# Patient Record
Sex: FEMALE | Race: WHITE | NOT HISPANIC OR LATINO | Employment: OTHER | URBAN - METROPOLITAN AREA
[De-identification: names, ages, dates, MRNs, and addresses within clinical notes are randomized per-mention and may not be internally consistent; named-entity substitution may affect disease eponyms.]

---

## 2021-03-24 ENCOUNTER — TELEPHONE (OUTPATIENT)
Dept: SURGICAL ONCOLOGY | Facility: CLINIC | Age: 66
End: 2021-03-24

## 2021-03-24 NOTE — TELEPHONE ENCOUNTER
New Patient Intake Melanoma   Patient Details:     Willie Pollard     1955     [id number]     Background Information:   [address] starts by opening a telephone encounter and gathering the following information   Who is calling to schedule?  self   Referring Provider Previous pt of Kita Masters   To Which Speciality? Medical Oncology   Reason for Visit? History Of   Tumor Type/Diagnosis? melanoma   Is there a confirmed diagnosis from Biopsy/tissue reviewed by Pathology? yes   Date of Tissue Diagnosis  If outside of [facility] obtain report and slides July 2019   Is the patient aware of diagnosis? yes   Has Imaging been done? no   If so, when and where? If outside of [facility], obtain records and disk na   Has Bloodwork been done? no   If so, when and where? If outside of [facility], obtain records  na   Is there a personal history of cancer? If yes, what kind? no   Is there a family history of cancer? If so, what kind? no    Scheduling Information:   Preferred [address]   Are there any days the patient cannot be seen? na   Miscellaneous: Pt saw Dr Kita Masters every 6 months  No treatment  Blood work was completed  After completing the above information, please route to finance, nurse navigation and clinical trials for review

## 2021-05-04 ENCOUNTER — TELEPHONE (OUTPATIENT)
Dept: SURGICAL ONCOLOGY | Facility: CLINIC | Age: 66
End: 2021-05-04

## 2021-05-04 NOTE — TELEPHONE ENCOUNTER
Dr Jolene Cox is now PAR with pt insurance, [facility] for pt to call and schedule

## 2022-01-14 ENCOUNTER — TELEPHONE (OUTPATIENT)
Dept: HEMATOLOGY ONCOLOGY | Facility: CLINIC | Age: 67
End: 2022-01-14

## 2022-01-14 NOTE — TELEPHONE ENCOUNTER
New Patient Intake Form   Patient Details:    Geri Escamilla  1955  [id number]    Appointment Information   Who is calling to schedule? Patient   If not self, what is the caller's name? Please put name of [facility] nurse as well  Referring provider Self Referral   What is the diagnosis? History of Melanoma   Is there a confirmed tissue diagnosis? No   Is patient aware of diagnosis? Yes   Have you had any imaging or labs done? If yes, where? (If imaging done outside of [facility], please remind patient to bring a disk ) No   Have you been seen by another Oncologist/Hematologist?  If so, who and where? Yes, NP [facility] On[facility]    Are the records in [facility] or Care Everywhere? No   Are records needed from an outside facility? Yes   If yes, Name of facility, city and state where facility is located  Preferred [place]   Is the patient willing to be seen by another provider?   (This is for breast patients only)    Miscellaneous Information: Requested to have patient have records sent to fax # [phone]

## 2022-01-24 ENCOUNTER — CONSULT (OUTPATIENT)
Dept: HEMATOLOGY ONCOLOGY | Facility: CLINIC | Age: 67
End: 2022-01-24
Payer: COMMERCIAL

## 2022-01-24 VITALS
HEIGHT: 65 IN | BODY MASS INDEX: 26.91 KG/M2 | RESPIRATION RATE: 16 BRPM | SYSTOLIC BLOOD PRESSURE: 110 MMHG | DIASTOLIC BLOOD PRESSURE: 70 MMHG | TEMPERATURE: 97.3 F | WEIGHT: 161.5 LBS

## 2022-01-24 DIAGNOSIS — C43.59 MALIGNANT MELANOMA OF TORSO EXCLUDING BREAST (HCC): Primary | ICD-10-CM

## 2022-01-24 DIAGNOSIS — D22.9 ATYPICAL MOLE: ICD-10-CM

## 2022-01-24 PROCEDURE — 88305 TISSUE EXAM BY PATHOLOGIST: CPT | Performed by: STUDENT IN AN ORGANIZED HEALTH CARE EDUCATION/TRAINING PROGRAM

## 2022-01-24 PROCEDURE — 88342 IMHCHEM/IMCYTCHM 1ST ANTB: CPT | Performed by: STUDENT IN AN ORGANIZED HEALTH CARE EDUCATION/TRAINING PROGRAM

## 2022-01-24 PROCEDURE — 88341 IMHCHEM/IMCYTCHM EA ADD ANTB: CPT | Performed by: STUDENT IN AN ORGANIZED HEALTH CARE EDUCATION/TRAINING PROGRAM

## 2022-01-24 PROCEDURE — 99204 OFFICE O/P NEW MOD 45 MIN: CPT | Performed by: INTERNAL MEDICINE

## 2022-01-24 RX ORDER — COVID-19 ANTIGEN TEST
KIT MISCELLANEOUS 2 TIMES DAILY
COMMUNITY

## 2022-01-24 RX ORDER — DIAZEPAM 5 MG/5ML
SOLUTION ORAL
COMMUNITY

## 2022-01-24 RX ORDER — TOPIRAMATE 100 MG/1
100 TABLET, FILM COATED ORAL DAILY
COMMUNITY
Start: 2021-10-21

## 2022-01-24 RX ORDER — ATORVASTATIN CALCIUM 10 MG/1
1 TABLET, FILM COATED ORAL DAILY
COMMUNITY
Start: 2021-12-06

## 2022-01-24 RX ORDER — GABAPENTIN 300 MG/1
300 CAPSULE ORAL
COMMUNITY
Start: 2021-11-23

## 2022-01-24 RX ORDER — PHENTERMINE HYDROCHLORIDE 37.5 MG/1
1 TABLET ORAL 2 TIMES DAILY
COMMUNITY
Start: 2022-01-12

## 2022-01-24 RX ORDER — OMEPRAZOLE 40 MG/1
1 CAPSULE, DELAYED RELEASE ORAL DAILY
COMMUNITY
Start: 2021-11-23

## 2022-01-24 RX ORDER — MV-MN/OM3/DHA/EPA/FISH/LUT/ZEA 250-5-1 MG
CAPSULE ORAL
COMMUNITY

## 2022-01-24 RX ORDER — CEPHRADINE 500 MG
200 CAPSULE ORAL
COMMUNITY

## 2022-01-24 RX ORDER — MV-MIN/FA/VIT K/LUTEIN/ZEAXANT 200MCG-5MG
CAPSULE ORAL
COMMUNITY

## 2022-01-24 RX ORDER — LIDOCAINE 50 MG/G
PATCH TOPICAL
COMMUNITY

## 2022-01-24 RX ORDER — MELATONIN: COMMUNITY

## 2022-01-24 RX ORDER — DULOXETIN HYDROCHLORIDE 30 MG/1
1 CAPSULE, DELAYED RELEASE ORAL DAILY
COMMUNITY
Start: 2021-11-23

## 2022-01-24 RX ORDER — MECLIZINE HCL 12.5 MG/1
TABLET ORAL
COMMUNITY

## 2022-01-24 RX ORDER — ASCORBIC ACID 500 MG
500 TABLET ORAL DAILY
COMMUNITY

## 2022-01-24 RX ORDER — METFORMIN HYDROCHLORIDE 750 MG/1
750 TABLET, EXTENDED RELEASE ORAL
COMMUNITY
Start: 2022-01-05

## 2022-01-24 NOTE — LETTER
February 1, 2022     Glen Mckinnon MD  [address] [facility]  [address] / [address]    Patient: Valeria Hawkins   YOB: 1955   Date of Visit: 1/24/2022       Dear Dr Marthann Frankel:    Thank you for referring Valeria Hawkins to me for evaluation  Below are my notes for this consultation  If you have questions, please do not hesitate to call me  I look forward to following your patient along with you  Sincerely,        Primo Hernandez MD        CC: No Recipients  Primo Hernandez MD  2/1/2022 10:14 AM  Sign when Signing Visit  [address] [facility]  [phone]  [phone]     Date of Visit: 1/24/2022  Name: Valeria Hawkins   YOB: 1955       Subjective    VISIT DIAGNOSIS:  Diagnoses and all orders for this visit:    Malignant melanoma of torso excluding breast ([facility] )  -     General procedure    Atypical mole  -     Tissue Exam; Future  -     Tissue Exam  -     General procedure    Other orders  -     Multiple Vitamin (MULTIVITAMIN ADULT PO); Take 1 capsule by mouth daily  -     Alpha-Lipoic Acid 200 MG CAPS; 200 mg  -     ascorbic acid (VITAMIN C) 500 MG tablet; Take 500 mg by mouth daily  -     atorvastatin (LIPITOR) 10 mg tablet; Take 1 tablet by mouth daily  -     Calcium Carbonate-Vitamin D (Calcium 600+D) 600-400 MG-UNIT per tablet  -     Calcium Carbonate 1500 (600 Ca) MG TABS; Take 600 mg by mouth  -     cholecalciferol (VITAMIN D3) 1,000 units tablet  -     cyanocobalamin (VITAMIN B-12) 1000 MCG tablet; Take by mouth  -     diazePAM 5 MG/5ML SOLN  -     DULoxetine (CYMBALTA) 30 mg delayed release capsule; Take 1 capsule by mouth daily  -     gabapentin (NEURONTIN) 300 mg capsule; Take 300 mg by mouth  -     lidocaine (Lidoderm) 5 %  -     meclizine (ANTIVERT) 12 5 MG tablet  -     metFORMIN (GLUCOPHAGE-XR) 750 mg 24 hr tablet;  Take 750 mg by mouth daily with dinner  -     Multiple Vitamins-Minerals (Ocuvite Adult 50+) CAPS  -     Multiple Vitamins-Minerals (PreserVision AREDS 2+Multi Vit) CAPS  -     Multiple Vitamins-Minerals (WOMENS 50+ MULTI VITAMIN/MIN PO)  -     Naproxen Sodium 220 MG CAPS; Take by mouth 2 (two) times a day  -     omeprazole (PriLOSEC) 40 MG capsule; Take 1 capsule by mouth daily  -     phentermine (ADIPEX-P) 37 5 MG tablet; Take 1 tablet by mouth 2 (two) times a day  -     topiramate (TOPAMAX) 100 mg tablet; Take 100 mg by mouth daily        Oncology History   [facility]align[facility]   6/26/2018 -  Cancer Staged    Staging form: Melanoma of the Skin, AJCC 8th Edition  - Clinical stage from 6/26/2018: Stage 0 (cTis, cN0, cM0) - Signed by Mirna Redd MD on 1/31/2022 6/26/2018 Initial Diagnosis    Malignant melanoma of torso excluding breast ([facility] )  MIS + margins     8/3/2018 Surgery    Excision - residual melanoma in situ and scar, negative margins        Cancer Staging  Malignant melanoma of torso excluding breast ([facility] )  Staging form: Melanoma of the Skin, AJCC 8th Edition  - Clinical stage from 6/26/2018: Stage 0 (cTis, cN0, cM0) - Signed by Mirna Redd MD on 1/31/2022       HISTORY OF PRESENT ILLNESS: Héctor Cedillo is a 61 y o  female  who I has seen at [facility] for her melanoma in situ  She was initially diagnosed with melanoma in situ in 2018  She states that she faithfully sees her dermatologist, Dr Evins Dancer, every 6 months  She has had BCC in the past  At her visit over the summer of 2018, Dr Evins Dancer noted a lesion on her back that had looked concerning  She underwent a biopsy on 6/26/2018  Pathology demonstrated melanoma in situ with + margins  She underwent excision on 8/3/2018  Pathology demonstrated residual melanoma in situ and scar, and the lesion was completely excised  She then got blood work done and a CXR  Bloodwork was normal but there was nodular scarring in the lingula on CXR   She then underwent a CT Chest for further evaluation  CT Chest demonstrated scarring in the lingula or atelectasis corresponding to CXR findings, and non-specific submentimeter lung nodules  All follow up was negative  She has developed progressive neuropathy in her feet over the past year  Also had Charcot foot, requiring a boot  She now no longer needs a boot  Has improved and no longer in pain  She had followed with me at [facility] from 9/2018 until I left in Feb 2021  She follows closely with her dermatologist Dr Kendrick Frey as well  She had seen my colleagues at [facility] as well in follow up  She is doing well today and is here to continue follow up with me here at [facility]  Continues to follow with Dr Kendrick Frey  She is worried about a lesion/spot just superior to her MIS scar  It doesn't itch or bleed and it isn't painful, she just feels it near her old scar and is concerned about it  Radiology review:   9/18/2018 CT Chest -   Small perifissural nodules  Linear scarring or atelectasis in the lingula  Consider non-contrast CT chest in 6 - 12 months  8/30/2018 CXR -  Likely nodular scarring in the lingula  Given the patient's history, non-contrast CT of the chest is recommended  Pathology review:   08/03/2018 excision:  Left upper back, excision - residual melanoma in situ and scar; completely excised    06/26/2018 biopsy:  Left upper back, shave biopsy - melanoma in situ; extending to the peripheral and deep tissue edges    She is retired but used to work at [employer]  She works for the [employer] in the [employer] during home games  She has increased sun exposure when she was young  Specifically, when she was 17, she visited friends in [facility] and experienced a bad sunburn with blisters on her back  No other episodes of blistering sunburns, but has had additional sunburns  She denies tanning beds  No family history of melanoma  Her father had cutaneous SCC    Her maternal aunt had breast cancer in her 46s (12) and she is alive and well  Her maternal uncle had throat cancer  She has dirty brown/light brown hair and blue eyes and is [place] descent, and maybe [place]  Health Maintenance:  Colonoscopy - up to date   MMGM - up to date  Pap smear - up to date        REVIEW OF SYSTEMS:  Review of Systems   Constitutional: Negative for appetite change, fatigue, fever and unexpected weight change  HENT:   Negative for lump/mass  Eyes: Negative for icterus  Respiratory: Negative for cough, shortness of breath and wheezing  Cardiovascular: Negative for leg swelling  Gastrointestinal: Negative for abdominal pain, constipation, diarrhea, nausea and vomiting  Genitourinary: Negative for difficulty urinating and hematuria  Musculoskeletal: Negative for arthralgias, gait problem and myalgias  Skin: Negative for itching and rash  Worried about spot superior and lateral to MIS scar site  Not painful, bleeding, or itching, just notices it there  Neurological: Negative for extremity weakness, gait problem, headaches, light-headedness and numbness  Hematological: Negative for adenopathy          MEDICATIONS:    Current Outpatient Medications:     Alpha-Lipoic Acid 200 MG CAPS, 200 mg, Disp: , Rfl:     ascorbic acid (VITAMIN C) 500 MG tablet, Take 500 mg by mouth daily, Disp: , Rfl:     atorvastatin (LIPITOR) 10 mg tablet, Take 1 tablet by mouth daily, Disp: , Rfl:     Calcium Carbonate 1500 (600 Ca) MG TABS, Take 600 mg by mouth, Disp: , Rfl:     Calcium Carbonate-Vitamin D (Calcium 600+D) 600-400 MG-UNIT per tablet, , Disp: , Rfl:     cholecalciferol (VITAMIN D3) 1,000 units tablet, , Disp: , Rfl:     cyanocobalamin (VITAMIN B-12) 1000 MCG tablet, Take by mouth, Disp: , Rfl:     diazePAM 5 MG/5ML SOLN, , Disp: , Rfl:     DULoxetine (CYMBALTA) 30 mg delayed release capsule, Take 1 capsule by mouth daily, Disp: , Rfl:     gabapentin (NEURONTIN) 300 mg capsule, Take 300 mg by mouth, Disp: , Rfl:     lidocaine (Lidoderm) 5 %, , Disp: , Rfl:     meclizine (ANTIVERT) 12 5 MG tablet, , Disp: , Rfl:     metFORMIN (GLUCOPHAGE-XR) 750 mg 24 hr tablet, Take 750 mg by mouth daily with dinner, Disp: , Rfl:     Multiple Vitamin (MULTIVITAMIN ADULT PO), Take 1 capsule by mouth daily, Disp: , Rfl:     Multiple Vitamins-Minerals (Ocuvite Adult 50+) CAPS, , Disp: , Rfl:     Multiple Vitamins-Minerals (PreserVision AREDS 2+Multi Vit) CAPS, , Disp: , Rfl:     Multiple Vitamins-Minerals (WOMENS 50+ MULTI VITAMIN/MIN PO), , Disp: , Rfl:     Naproxen Sodium 220 MG CAPS, Take by mouth 2 (two) times a day, Disp: , Rfl:     omeprazole (PriLOSEC) 40 MG capsule, Take 1 capsule by mouth daily, Disp: , Rfl:     phentermine (ADIPEX-P) 37 5 MG tablet, Take 1 tablet by mouth 2 (two) times a day, Disp: , Rfl:     topiramate (TOPAMAX) 100 mg tablet, Take 100 mg by mouth daily, Disp: , Rfl:      ALLERGIES:  Allergies   Allergen Reactions    Doxycycline Hives, Itching, Rash and Shortness Of Breath    Lidocaine Anaphylaxis, Dizziness, Itching, Palpitations, Rash, Shortness Of Breath, Throat Swelling and Tongue Swelling        ACTIVE PROBLEMS:  Patient Active Problem List   Diagnosis    Malignant [facility] )          PAST MEDICAL HISTORY:   Past Medical History:   Diagnosis Date    Allergic rhinitis     Arthritis     Benign neoplasm of large bowel     Chronic rhinitis     Colon polyp     Depression     Diabetes [facility]     GERD (gastroesophageal reflux disease)     Hypertension     Meniere's disease     Neuropathic pain     Skin cancer     BCC        PAST SURGICAL HISTORY:  Past Surgical History:   Procedure Laterality Date    COLONOSCOPY          SOCIAL HISTORY:  Social History     Socioeconomic History    Marital status: Single     Spouse name: None    Number of children: None    Years of education: None    Highest education level: None Occupational History    None   Tobacco Use    Smoking status: Never Smoker    Smokeless tobacco: Never Used   Vaping Use    Vaping Use: Never used   Substance and Sexual Activity    Alcohol use: Not Currently    Drug use: Never    Sexual activity: None   Other Topics Concern    None   Social History Narrative    None     Social Determinants of Health     Financial Resource Strain: Not on file   Food Insecurity: Not on file   Transportation Needs: Not on file   Physical Activity: Not on file   Stress: Not on file   Social Connections: Not on file   Intimate Partner Violence: Not on file   Housing Stability: Not on file        FAMILY HISTORY:  Family History   Problem Relation Age of Onset    Hypertension Mother     Arthritis Mother     Hyperlipidemia Mother     Thyroid disease Mother     Squamous cell carcinoma Father     Hypertension Father     Heart disease Father     Breast cancer Maternal Aunt     Throat cancer Maternal Uncle           Objective    PHYSICAL EXAMINATION:   Blood pressure 110/70, temperature (!) 97 3 °F (36 3 °C), temperature source Tympanic, resp  rate 16, height 5' 5" (1 651 m), weight 73 3 kg (161 lb 8 oz)  Pain Score: 0-No pain      ECOG Performance Status      Most Recent Value   ECOG Performance Status 0 - Fully active, able to carry on all pre-disease performance without restriction            Physical Exam  Constitutional:       General: She is not in acute distress  Appearance: Normal appearance  She is not toxic-appearing  HENT:      Mouth/Throat:      Mouth: Mucous membranes are moist       Pharynx: Oropharynx is clear  Eyes:      General: No scleral icterus  Cardiovascular:      Rate and Rhythm: Normal rate and regular rhythm  Pulses: Normal pulses  Heart sounds: No murmur heard  No friction rub  No gallop  Pulmonary:      Effort: Pulmonary effort is normal  No respiratory distress  Breath sounds: Normal breath sounds   No wheezing or rales    Chest:   Breasts:      Right: No axillary adenopathy or supraclavicular adenopathy  Left: No axillary adenopathy or supraclavicular adenopathy  Abdominal:      General: There is no distension  Palpations: There is no mass  Tenderness: There is no abdominal tenderness  There is no rebound  Musculoskeletal:         General: No swelling or tenderness  Right lower leg: No edema  Left lower leg: No edema  Lymphadenopathy:      Head:      Right side of head: No submandibular, preauricular or posterior auricular adenopathy  Left side of head: No submandibular, preauricular or posterior auricular adenopathy  Cervical: No cervical adenopathy  Right cervical: No superficial or posterior cervical adenopathy  Left cervical: No superficial or posterior cervical adenopathy  Upper Body:      Right upper body: No supraclavicular or axillary adenopathy  Left upper body: No supraclavicular or axillary adenopathy  Lower Body: No right inguinal adenopathy  No left inguinal adenopathy  Skin:     Findings: No rash  Comments: Well healed surgical scar  No evidence of recurrence at primary site  Small 4-5 mm skin colored lesion - looks like mole  Neurological:      General: No focal deficit present  Mental Status: She is alert and oriented to person, place, and time  Psychiatric:         Mood and Affect: Mood normal          Behavior: Behavior normal          Thought Content: Thought content normal          Judgment: Judgment normal            I reviewed lab data in the chart  Labs in [facility] from 1/19/2022    WBC 3 4 - 10 8 x10E3/uL 6 8     RBC 3 77 - 5 28 x10E6/uL 4 60     Hemoglobin 11 1 - 15 9 g/dL 13 9     Hematocrit 34 0 - 46 6 % 40 9     MCV 79 - 97 fL 89     MCH 26 6 - 33 0 pg 30 2     MCHC 31 5 - 35 7 g/dL 34 0     RDW 11 7 - 15 4 % 12 9     Platelets 576 - 179 [id number]/     Neutrophils Not Estab  % 63     Lymphs Not Estab  % 24     Monocytes Not Estab  % 7     Eos Not Estab  % 5     Basos Not Estab  % 1     Neutrophils (Absolute) 1 4 - 7 0 x10E3/uL 4 3     Lymphs (Absolute) 0 7 - 3 1 x10E3/uL 1 6     Monocytes(Absolute) 0 1 - 0 9 x10E3/uL 0 4     Eos (Absolute) 0 0 - 0 4 x10E3/uL 0 4     Baso (Absolute) 0 0 - 0 2 x10E3/uL 0 0     Immature Granulocytes Not Estab  % 0     Immature Grans (Abs) 0 0 - 0 1 x10E3/uL 0 0        Glucose, Serum 65 - 99 mg/dL 104 High      BUN 8 - 27 mg/dL 20     Creatinine, Serum 0 57 - 1 00 mg/dL 1 00     eGFR If NonAfricn Am >59 mL/min/1 73 60     eGFR If Africn Am >59 mL/min/1 73 69  **In accordance with recommendations from the NKF-ASN Task force,**     [facility] is in the process of updating its eGFR calculation to the     7925 CKD-EPI creatinine equation that estimates kidney function     without a race variable  BUN/Creatinine Ratio 12 - 28 20     Sodium, Serum 134 - 144 mmol/L 142     Potassium, Serum 3 5 - 5 2 mmol/L 4 6     Chloride, Serum 96 - 106 mmol/L 106     Carbon Dioxide, Total 20 - 29 mmol/L 24     Calcium, Serum 8 7 - 10 3 mg/dL 9 9     Protein, Total, Serum 6 0 - 8 5 g/dL 6 6     Albumin, Serum 3 8 - 4 8 g/dL 4 5     Globulin, Total 1 5 - 4 5 g/dL 2 1     A/G Ratio 1 2 - 2 2 2 1     Bilirubin, Total 0 0 - 1 2 mg/dL 0 3     Alkaline Phosphatase, S 44 - 121 IU/L 97     AST (SGOT) 0 - 40 IU/L 35     ALT (SGPT) 0 - 32 IU/L 27        - 226 IU/L 179              RECENT IMAGING:  No results found  Assessment/Plan  Ms Gerry Ho is a 61 yr female with melanoma in situ with new concerning skin lesion here to establish care here with me at [facility] and follow up  1  Malignant melanoma of torso excluding breast ([address] )  She is doing well and feels good  Continues ongoing close dermatological follow up with Dr Yossi Gonzalez  Reviewed labs in [facility] and they look good, with minor elevation in glucose  No clinical evidence of melanoma recurrence        Ms Gerry Ho will continue to alternate visits between myself and Dr Tomasz Tello  She will return to see me in 6 months  Labs ordered and provided her with scripts  2  Atypical mole  She is concerned about the skin lesion near her previous scar  Since it is concerning for her, we will biopsy the lesion today  I will call her with the pathology results  General procedure    Date/Time: 1/24/2022 10:07 AM  Performed by: Kwasi Farley MD  Authorized by: Kwasi Farley MD     Patient location:  Other (comment)  Assisting Provider(s): No    Consent:     Consent obtained:  Verbal    Consent given by:  Patient    Risks discussed:  Bleeding, infection and pain    Alternatives discussed:  No treatment and observation  Indications:     Indications:  Mole/lesion evaluation  Pre-procedure details:     Skin preparation:  Betadine    Preparation: Patient was prepped and draped in the usual sterile fashion    Anesthesia (see MAR for exact dosages): Anesthesia method:  Local infiltration    Local anesthetic:  Bupivacaine 0 25% w/o epi  Procedure Detail:     Procedure note (site, laterality, method, findings): After obtaining informed consent, the patient was draped and prepped in the [facility]  Betadine was used for cleansing  The area was anesthetized with 0 25% bupivacaine w/out epinephrine  A 4 mm punch biopsy was performed without difficulty  The biopsy was closed with 1 4-0 ethilon sutures  Bacitracin was applied to the biopsy site and a dry sterile dressing was placed  Wound care instructions were provided  The patient tolerated the procedure well with no complications  She will have her sutures removed by her PCP, or will come back here if unable to go to PCP  Post-procedure details:     Patient tolerance of procedure: Tolerated well, no immediate complications        - Tissue Exam; Future  - Tissue Exam    She knows to call with any issues or concerns prior to her next visit      Sylwia Zuniga MD, PhD

## 2022-01-31 PROBLEM — C43.59 MALIGNANT MELANOMA OF TORSO EXCLUDING BREAST (HCC): Status: ACTIVE | Noted: 2022-01-31

## 2022-01-31 NOTE — RESULT ENCOUNTER NOTE
Called and let Ms Juan Brown know that her biopsy came back as dermal melanocytic nevus  She was happy to hear this news      Erica Olvera MD, PhD

## 2022-01-31 NOTE — PROGRESS NOTES
[facility]ATOLOGY ONCOL[facility]Y [facility] [place]  [address] [id number]  [phone]     Date of Visit: 1/24/2022  Name: Evelyn Weinberg   YOB: 1955       Subjective    VISIT DIAGNOSIS:  Diagnoses and all orders for this visit:    Malignant melanoma of torso excluding breast ([facility] [address] )  -     General procedure    Atypical mole  -     Tissue Exam; Future  -     Tissue Exam  -     General procedure    Other orders  -     Multiple Vitamin (MULTIVITAMIN ADULT PO); Take 1 capsule by mouth daily  -     Alpha-Lipoic Acid 200 MG CAPS; 200 mg  -     ascorbic acid (VITAMIN C) 500 MG tablet; Take 500 mg by mouth daily  -     atorvastatin (LIPITOR) 10 mg tablet; Take 1 tablet by mouth daily  -     Calcium Carbonate-Vitamin D (Calcium 600+D) 600-400 MG-UNIT per tablet  -     Calcium Carbonate 1500 (600 Ca) MG TABS; Take 600 mg by mouth  -     cholecalciferol (VITAMIN D3) 1,000 units tablet  -     cyanocobalamin (VITAMIN B-12) 1000 MCG tablet; Take by mouth  -     diazePAM 5 MG/5ML SOLN  -     DULoxetine (CYMBALTA) 30 mg delayed release capsule; Take 1 capsule by mouth daily  -     gabapentin (NEURONTIN) 300 mg capsule; Take 300 mg by mouth  -     lidocaine (Lidoderm) 5 %  -     meclizine (ANTIVERT) 12 5 MG tablet  -     metFORMIN (GLUCOPHAGE-XR) 750 mg 24 hr tablet; Take 750 mg by mouth daily with dinner  -     Multiple Vitamins-Minerals (Ocuvite Adult 50+) CAPS  -     Multiple Vitamins-Minerals (PreserVision AREDS 2+Multi Vit) CAPS  -     Multiple Vitamins-Minerals (WOMENS 50+ MULTI VITAMIN/MIN PO)  -     Naproxen Sodium 220 MG CAPS; Take by mouth 2 (two) times a day  -     omeprazole (PriLOSEC) 40 MG capsule; Take 1 capsule by mouth daily  -     phentermine (ADIPEX-P) 37 5 MG tablet; Take 1 tablet by mouth 2 (two) times a day  -     topiramate (TOPAMAX) 100 mg tablet;  Take 100 mg by mouth daily        Oncology History   Malignant melanoma of torso excluding breast ([facility] [address] )   6/26/2018 - Cancer Staged    Staging form: Melanoma of the Skin, AJCC 8th Edition  - Clinical stage from 6/26/2018: Stage 0 (cTis, cN0, cM0) - Signed by Rosemary Núñez MD on 1/31/2022 6/26/2018 Initial Diagnosis    Malignant melanoma of torso excluding breast ([facility] [address] )  MIS + margins     8/3/2018 Surgery    Excision - residual melanoma in situ and scar, negative margins        Cancer Staging  Malignant melanoma of torso excluding breast ([facility] [address] )  Staging form: Melanoma of the Skin, AJCC 8th Edition  - Clinical stage from 6/26/2018: Stage 0 (cTis, cN0, cM0) - Signed by Rosemary Núñez MD on 1/31/2022       HISTORY OF PRESENT ILLNESS: Debora Fu is a 61 y o  female  who I has seen at [facility] for her melanoma in situ  She was initially diagnosed with melanoma in situ in 2018  She states that she faithfully sees her dermatologist, Dr Darell Duran, every 6 months  She has had BCC in the past  At her visit over the summer of 2018, Dr Darell Duran noted a lesion on her back that had looked concerning  She underwent a biopsy on 6/26/2018  Pathology demonstrated melanoma in situ with + margins  She underwent excision on 8/3/2018  Pathology demonstrated residual melanoma in situ and scar, and the lesion was completely excised  She then got blood work done and a CXR  Bloodwork was normal but there was nodular scarring in the lingula on CXR  She then underwent a CT Chest for further evaluation  CT Chest demonstrated scarring in the lingula or atelectasis corresponding to CXR findings, and non-specific submentimeter lung nodules  All follow up was negative  She has developed progressive neuropathy in her feet over the past year  Also had Charcot foot, requiring a boot  She now no longer needs a boot  Has improved and no longer in pain  She had followed with me at [facility] from 9/2018 until I left in Feb 2021  She follows closely with her dermatologist Dr Darell Duran as well    She had seen my colleagues at [facility] as well in follow up  She is doing well today and is here to continue follow up with me here at [facility]  Continues to follow with Dr Naif Resendiz  She is worried about a lesion/spot just superior to her MIS scar  It doesn't itch or bleed and it isn't painful, she just feels it near her old scar and is concerned about it  Radiology review:   9/18/2018 CT Chest -   Small perifissural nodules  Linear scarring or atelectasis in the lingula  Consider non-contrast CT chest in 6 - 12 months  8/30/2018 CXR -  Likely nodular scarring in the lingula  Given the patient's history, non-contrast CT of the chest is recommended  Pathology review:   08/03/2018 excision:  Left upper back, excision - residual melanoma in situ and scar; completely excised    06/26/2018 biopsy:  Left upper back, shave biopsy - melanoma in situ; extending to the peripheral and deep tissue edges    She is retired but used to work at [employer]  She works for the [employer] in the [email or web address] during home games  She has increased sun exposure when she was young  Specifically, when she was 17, she visited friends in [facility] and experienced a bad sunburn with blisters on her back  No other episodes of blistering sunburns, but has had additional sunburns  She denies tanning beds  No family history of melanoma  Her father had cutaneous SCC  Her maternal aunt had breast cancer in her 46s (12) and she is alive and well  Her maternal uncle had throat cancer  She has dirty brown/light brown hair and blue eyes and is [place] descent, and maybe [place]  Health Maintenance:  Colonoscopy - up to date   MMGM - up to date  Pap smear - up to date        REVIEW OF SYSTEMS:  Review of Systems   Constitutional: Negative for appetite change, fatigue, fever and unexpected weight change  HENT:   Negative for lump/mass  Eyes: Negative for icterus  Respiratory: Negative for cough, shortness of breath and wheezing  Cardiovascular: Negative for leg swelling  Gastrointestinal: Negative for abdominal pain, constipation, diarrhea, nausea and vomiting  Genitourinary: Negative for difficulty urinating and hematuria  Musculoskeletal: Negative for arthralgias, gait problem and myalgias  Skin: Negative for itching and rash  Worried about spot superior and lateral to MIS scar site  Not painful, bleeding, or itching, just notices it there  Neurological: Negative for extremity weakness, gait problem, headaches, light-headedness and numbness  Hematological: Negative for adenopathy          MEDICATIONS:    Current Outpatient Medications:     Alpha-Lipoic Acid 200 MG CAPS, 200 mg, Disp: , Rfl:     ascorbic acid (VITAMIN C) 500 MG tablet, Take 500 mg by mouth daily, Disp: , Rfl:     atorvastatin (LIPITOR) 10 mg tablet, Take 1 tablet by mouth daily, Disp: , Rfl:     Calcium Carbonate 1500 (600 Ca) MG TABS, Take 600 mg by mouth, Disp: , Rfl:     Calcium Carbonate-Vitamin D (Calcium 600+D) 600-400 MG-UNIT per tablet, , Disp: , Rfl:     cholecalciferol (VITAMIN D3) 1,000 units tablet, , Disp: , Rfl:     cyanocobalamin (VITAMIN B-12) 1000 MCG tablet, Take by mouth, Disp: , Rfl:     diazePAM 5 MG/5ML SOLN, , Disp: , Rfl:     DULoxetine (CYMBALTA) 30 mg delayed release capsule, Take 1 capsule by mouth daily, Disp: , Rfl:     gabapentin (NEURONTIN) 300 mg capsule, Take 300 mg by mouth, Disp: , Rfl:     lidocaine (Lidoderm) 5 %, , Disp: , Rfl:     meclizine (ANTIVERT) 12 5 MG tablet, , Disp: , Rfl:     metFORMIN (GLUCOPHAGE-XR) 750 mg 24 hr tablet, Take 750 mg by mouth daily with dinner, Disp: , Rfl:     Multiple Vitamin (MULTIVITAMIN ADULT PO), Take 1 capsule by mouth daily, Disp: , Rfl:     Multiple Vitamins-Minerals (Ocuvite Adult 50+) CAPS, , Disp: , Rfl:     Multiple Vitamins-Minerals (PreserVision AREDS 2+Multi Vit) CAPS, , Disp: , Rfl:     Multiple Vitamins-Minerals (WOMENS 50+ MULTI VITAMIN/MIN PO), , Disp: , Rfl:     Naproxen Sodium 220 MG CAPS, Take by mouth 2 (two) times a day, Disp: , Rfl:     omeprazole (PriLOSEC) 40 MG capsule, Take 1 capsule by mouth daily, Disp: , Rfl:     phentermine (ADIPEX-P) 37 5 MG tablet, Take 1 tablet by mouth 2 (two) times a day, Disp: , Rfl:     topiramate (TOPAMAX) 100 mg tablet, Take 100 mg by mouth daily, Disp: , Rfl:      ALLERGIES:  Allergies   Allergen Reactions    Doxycycline Hives, Itching, Rash and Shortness Of Breath    Lidocaine Anaphylaxis, Dizziness, Itching, Palpitations, Rash, Shortness Of Breath, Throat Swelling and Tongue Swelling        ACTIVE PROBLEMS:  Patient Active Problem List   Diagnosis    Malignant melanoma of torso excluding breast ([facility] [facility] )          PAST MEDICAL HISTORY:   Past Medical History:   Diagnosis Date    Allergic rhinitis     Arthritis     Benign neoplasm of large bowel     Chronic rhinitis     Colon polyp     Depression     Diabetes mellitus (HCC)     GERD (gastroesophageal reflux disease)     Hypertension     Meniere's disease     Neuropathic pain     Skin cancer     BCC        PAST SURGICAL HISTORY:  Past Surgical History:   Procedure Laterality Date    COLONOSCOPY          SOCIAL HISTORY:  Social History     Socioeconomic History    Marital status: Single     Spouse name: None    Number of children: None    Years of education: None    Highest education level: None   Occupational History    None   Tobacco Use    Smoking status: Never Smoker    Smokeless tobacco: Never Used   Vaping Use    Vaping Use: Never used   Substance and Sexual Activity    Alcohol use: Not Currently    Drug use: Never    Sexual activity: None   Other Topics Concern    None   Social History Narrative    None     Social Determinants of Health     Financial Resource Strain: Not on file   Food Insecurity: Not on file   Transportation Needs: Not on file   Physical Activity: Not on file   Stress: Not on file   Social Connections: Not on file Intimate Partner Violence: Not on file   Housing Stability: Not on file        FAMILY HISTORY:  Family History   Problem Relation Age of Onset    Hypertension Mother     Arthritis Mother     Hyperlipidemia Mother     Thyroid disease Mother     Squamous cell carcinoma Father     Hypertension Father     Heart disease Father     Breast cancer Maternal Aunt     Throat cancer Maternal Uncle           Objective    PHYSICAL EXAMINATION:   Blood pressure 110/70, temperature (!) 97 3 °F (36 3 °C), temperature source Tympanic, resp  rate 16, height 5' 5" (1 651 m), weight 73 3 kg (161 lb 8 oz)  Pain Score: 0-No pain      ECOG Performance Status      Most Recent Value   ECOG Performance Status 0 - Fully active, able to carry on all pre-disease performance without restriction            Physical Exam  Constitutional:       General: She is not in acute distress  Appearance: Normal appearance  She is not toxic-appearing  HENT:      Mouth/Throat:      Mouth: Mucous membranes are moist       Pharynx: Oropharynx is clear  Eyes:      General: No scleral icterus  Cardiovascular:      Rate and Rhythm: Normal rate and regular rhythm  Pulses: Normal pulses  Heart sounds: No murmur heard  No friction rub  No gallop  Pulmonary:      Effort: Pulmonary effort is normal  No respiratory distress  Breath sounds: Normal breath sounds  No wheezing or rales  Chest:   Breasts:      Right: No axillary adenopathy or supraclavicular adenopathy  Left: No axillary adenopathy or supraclavicular adenopathy  Abdominal:      General: There is no distension  Palpations: There is no mass  Tenderness: There is no abdominal tenderness  There is no rebound  Musculoskeletal:         General: No swelling or tenderness  Right lower leg: No edema  Left lower leg: No edema     Lymphadenopathy:      Head:      Right side of head: No submandibular, preauricular or posterior auricular adenopathy  Left side of head: No submandibular, preauricular or posterior auricular adenopathy  Cervical: No cervical adenopathy  Right cervical: No superficial or posterior cervical adenopathy  Left cervical: No superficial or posterior cervical adenopathy  Upper Body:      Right upper body: No supraclavicular or axillary adenopathy  Left upper body: No supraclavicular or axillary adenopathy  Lower Body: No right inguinal adenopathy  No left inguinal adenopathy  Skin:     Findings: No rash  Comments: Well healed surgical scar  No evidence of recurrence at primary site  Small 4-5 mm skin colored lesion - looks like mole  Neurological:      General: No focal deficit present  Mental Status: She is alert and oriented to person, place, and time  Psychiatric:         Mood and Affect: Mood normal          Behavior: Behavior normal          Thought Content: Thought content normal          Judgment: Judgment normal            I reviewed lab data in the chart    Labs in [facility] from 1/19/2022    WBC 3 4 - 10 8 x10E3/uL 6 8     RBC 3 77 - 5 28 x10E6/uL 4 60     Hemoglobin 11 1 - 15 9 g/dL 13 9     Hematocrit 34 0 - 46 6 % 40 9     MCV 79 - 97 fL 89     MCH 26 6 - 33 0 pg 30 2     MCHC 31 5 - 35 7 g/dL 34 0     RDW 11 7 - 15 4 % 12 9     Platelets 731 - 067 [id number]     Neutrophils Not Estab  % 63     Lymphs Not Estab  % 24     Monocytes Not Estab  % 7     Eos Not Estab  % 5     Basos Not Estab  % 1     Neutrophils (Absolute) 1 4 - 7 0 x10E3/uL 4 3     Lymphs (Absolute) 0 7 - 3 1 x10E3/uL 1 6     Monocytes(Absolute) 0 1 - 0 9 x10E3/uL 0 4     Eos (Absolute) 0 0 - 0 4 x10E3/uL 0 4     Baso (Absolute) 0 0 - 0 2 x10E3/uL 0 0     Immature Granulocytes Not Estab  % 0     Immature Grans (Abs) 0 0 - 0 1 x10E3/uL 0 0        Glucose, Serum 65 - 99 mg/dL 104 High      BUN 8 - 27 mg/dL 20     Creatinine, Serum 0 57 - 1 00 mg/dL 1 00     eGFR If NonAfricn Am >59 mL/min/1 73 60     eGFR If Africn Am >59 mL/min/1 73 69  **In accordance with recommendations from the NKF-ASN Task force,**     [facility] is in the process of updating its eGFR calculation to the     2021 CKD-EPI creatinine equation that estimates kidney function     without a race variable  BUN/Creatinine Ratio 12 - 28 20     Sodium, Serum 134 - 144 mmol/L 142     Potassium, Serum 3 5 - 5 2 mmol/L 4 6     Chloride, Serum 96 - 106 mmol/L 106     Carbon Dioxide, Total 20 - 29 mmol/L 24     Calcium, Serum 8 7 - 10 3 mg/dL 9 9     Protein, Total, Serum 6 0 - 8 5 g/dL 6 6     Albumin, Serum 3 8 - 4 8 g/dL 4 5     Globulin, Total 1 5 - 4 5 g/dL 2 1     A/G Ratio 1 2 - 2 2 2 1     Bilirubin, Total 0 0 - 1 2 mg/dL 0 3     Alkaline Phosphatase, S 44 - 121 IU/L 97     AST (SGOT) 0 - 40 IU/L 35     ALT (SGPT) 0 - 32 IU/L 27        - 226 IU/L 179              RECENT IMAGING:  No results found  Assessment/Plan  Ms Noemi Castleman is a 61 yr female with melanoma in situ with new concerning skin lesion here to establish care here with me at [facility] and follow up  1  Malignant melanoma of torso excluding breast ([facility] [address] )  She is doing well and feels good  Continues ongoing close dermatological follow up with Dr Fidel Harden  Reviewed labs in [facility] and they look good, with minor elevation in glucose  No clinical evidence of melanoma recurrence  Ms Noemi Castleman will continue to alternate visits between myself and Dr Miller Mediate  She will return to see me in 6 months  Labs ordered and provided her with scripts  2  Atypical mole  She is concerned about the skin lesion near her previous scar  Since it is concerning for her, we will biopsy the lesion today  I will call her with the pathology results      General procedure    Date/Time: 1/24/2022 10:07 AM  Performed by: Stephani Curry MD  Authorized by: Stepahni Curry MD     Patient location:  Other (comment)  Assisting Provider(s): No    Consent: Consent obtained:  Verbal    Consent given by:  Patient    Risks discussed:  Bleeding, infection and pain    Alternatives discussed:  No treatment and observation  Indications:     Indications:  Mole/lesion evaluation  Pre-procedure details:     Skin preparation:  Betadine    Preparation: Patient was prepped and draped in the usual sterile fashion    Anesthesia (see MAR for exact dosages): Anesthesia method:  Local infiltration    Local anesthetic:  Bupivacaine 0 25% w/o epi  Procedure Detail:     Procedure note (site, laterality, method, findings): After obtaining informed consent, the patient was draped and prepped in the [facility]  Betadine was used for cleansing  The area was anesthetized with 0 25% bupivacaine w/out epinephrine  A 4 mm punch biopsy was performed without difficulty  The biopsy was closed with 1 4-0 ethilon sutures  Bacitracin was applied to the biopsy site and a dry sterile dressing was placed  Wound care instructions were provided  The patient tolerated the procedure well with no complications  She will have her sutures removed by her PCP, or will come back here if unable to go to PCP  Post-procedure details:     Patient tolerance of procedure: Tolerated well, no immediate complications        - Tissue Exam; Future  - Tissue Exam    She knows to call with any issues or concerns prior to her next visit      Harriet Bullock MD, PhD

## 2022-07-22 ENCOUNTER — TELEPHONE (OUTPATIENT)
Dept: HEMATOLOGY ONCOLOGY | Facility: CLINIC | Age: 67
End: 2022-07-22

## 2022-07-22 NOTE — TELEPHONE ENCOUNTER
Spoke with pt in regards to having labs drawn, pt verbalized understanding  Pt will be going day of

## 2022-07-25 ENCOUNTER — APPOINTMENT (OUTPATIENT)
Dept: LAB | Facility: CLINIC | Age: 67
End: 2022-07-25
Payer: MEDICARE

## 2022-07-25 ENCOUNTER — TELEPHONE (OUTPATIENT)
Dept: HEMATOLOGY ONCOLOGY | Facility: CLINIC | Age: 67
End: 2022-07-25

## 2022-07-25 ENCOUNTER — OFFICE VISIT (OUTPATIENT)
Dept: HEMATOLOGY ONCOLOGY | Facility: CLINIC | Age: 67
End: 2022-07-25
Payer: MEDICARE

## 2022-07-25 VITALS
SYSTOLIC BLOOD PRESSURE: 140 MMHG | HEART RATE: 114 BPM | BODY MASS INDEX: 26.82 KG/M2 | DIASTOLIC BLOOD PRESSURE: 82 MMHG | OXYGEN SATURATION: 89 % | HEIGHT: 65 IN | WEIGHT: 161 LBS | TEMPERATURE: 97.2 F | RESPIRATION RATE: 16 BRPM

## 2022-07-25 DIAGNOSIS — E11.610 CHARCOT FOOT DUE TO DIABETES MELLITUS (HCC): ICD-10-CM

## 2022-07-25 DIAGNOSIS — D03.59 MELANOMA IN SITU OF BACK (HCC): Primary | ICD-10-CM

## 2022-07-25 DIAGNOSIS — C43.59 MALIGNANT MELANOMA OF TORSO EXCLUDING BREAST (HCC): ICD-10-CM

## 2022-07-25 DIAGNOSIS — D03.59 MELANOMA IN SITU OF BACK (HCC): ICD-10-CM

## 2022-07-25 LAB
ALBUMIN SERPL BCP-MCNC: 3.9 G/DL (ref 3.5–5)
ALP SERPL-CCNC: 79 U/L (ref 34–104)
ALT SERPL W P-5'-P-CCNC: 17 U/L (ref 7–52)
ANION GAP SERPL CALCULATED.3IONS-SCNC: 5 MMOL/L (ref 4–13)
AST SERPL W P-5'-P-CCNC: 20 U/L (ref 13–39)
BASOPHILS # BLD AUTO: 0.05 THOUSANDS/ΜL (ref 0–0.1)
BASOPHILS NFR BLD AUTO: 1 % (ref 0–1)
BILIRUB SERPL-MCNC: 0.36 MG/DL (ref 0.2–1)
BUN SERPL-MCNC: 23 MG/DL (ref 5–25)
CALCIUM SERPL-MCNC: 9.2 MG/DL (ref 8.4–10.2)
CHLORIDE SERPL-SCNC: 108 MMOL/L (ref 96–108)
CO2 SERPL-SCNC: 28 MMOL/L (ref 21–32)
CREAT SERPL-MCNC: 0.92 MG/DL (ref 0.6–1.3)
EOSINOPHIL # BLD AUTO: 0.27 THOUSAND/ΜL (ref 0–0.61)
EOSINOPHIL NFR BLD AUTO: 4 % (ref 0–6)
ERYTHROCYTE [DISTWIDTH] IN BLOOD BY AUTOMATED COUNT: 13.2 % (ref 11.6–15.1)
GFR SERPL CREATININE-BSD FRML MDRD: 65 ML/MIN/1.73SQ M
GLUCOSE P FAST SERPL-MCNC: 102 MG/DL (ref 65–99)
HCT VFR BLD AUTO: 40.6 % (ref 34.8–46.1)
HGB BLD-MCNC: 13.4 G/DL (ref 11.5–15.4)
IMM GRANULOCYTES # BLD AUTO: 0.03 THOUSAND/UL (ref 0–0.2)
IMM GRANULOCYTES NFR BLD AUTO: 1 % (ref 0–2)
LDH SERPL-CCNC: 126 U/L (ref 140–271)
LYMPHOCYTES # BLD AUTO: 1.85 THOUSANDS/ΜL (ref 0.6–4.47)
LYMPHOCYTES NFR BLD AUTO: 30 % (ref 14–44)
MCH RBC QN AUTO: 31 PG (ref 26.8–34.3)
MCHC RBC AUTO-ENTMCNC: 33 G/DL (ref 31.4–37.4)
MCV RBC AUTO: 94 FL (ref 82–98)
MONOCYTES # BLD AUTO: 0.39 THOUSAND/ΜL (ref 0.17–1.22)
MONOCYTES NFR BLD AUTO: 6 % (ref 4–12)
NEUTROPHILS # BLD AUTO: 3.65 THOUSANDS/ΜL (ref 1.85–7.62)
NEUTS SEG NFR BLD AUTO: 58 % (ref 43–75)
NRBC BLD AUTO-RTO: 0 /100 WBCS
PLATELET # BLD AUTO: 287 THOUSANDS/UL (ref 149–390)
PMV BLD AUTO: 9.4 FL (ref 8.9–12.7)
POTASSIUM SERPL-SCNC: 4.1 MMOL/L (ref 3.5–5.3)
PROT SERPL-MCNC: 6.2 G/DL (ref 6.4–8.4)
RBC # BLD AUTO: 4.32 MILLION/UL (ref 3.81–5.12)
SODIUM SERPL-SCNC: 141 MMOL/L (ref 135–147)
WBC # BLD AUTO: 6.24 THOUSAND/UL (ref 4.31–10.16)

## 2022-07-25 PROCEDURE — 99213 OFFICE O/P EST LOW 20 MIN: CPT | Performed by: INTERNAL MEDICINE

## 2022-07-25 PROCEDURE — 80053 COMPREHEN METABOLIC PANEL: CPT

## 2022-07-25 PROCEDURE — 85025 COMPLETE CBC W/AUTO DIFF WBC: CPT

## 2022-07-25 PROCEDURE — 83615 LACTATE (LD) (LDH) ENZYME: CPT

## 2022-07-25 PROCEDURE — 36415 COLL VENOUS BLD VENIPUNCTURE: CPT

## 2022-07-25 NOTE — PROGRESS NOTES
[facility] [place]  [address] [facility] [address]  [phone]     Date of Visit: 7/25/2022  Name: Claire Maddox   YOB: 1955        Subjective    VISIT DIAGNOSIS:  Diagnoses and all orders for this visit:    Melanoma in situ of back ([address] )  -     CBC and differential; Future  -     Comprehensive metabolic panel; Future  -     LD,Blood; Future    Malignant melanoma of torso excluding breast ([address] )    Charcot foot due to diabetes mellitus [facility])        Oncology History   Malignant melanoma of torso excluding breast ([address] )   6/26/2018 -  Cancer Staged    Staging form: Melanoma of the Skin, AJCC 8th Edition  - Clinical stage from 6/26/2018: Stage 0 (cTis, cN0, cM0) - Signed by Delores Quispe MD on 1/31/2022 6/26/2018 Initial Diagnosis    Malignant melanoma of torso excluding breast ([facility] )  MIS + margins     8/3/2018 Surgery    Excision - residual melanoma in situ and scar, negative margins        Cancer Staging  Malignant melanoma of torso excluding breast ([facility] )  Staging form: Melanoma of the Skin, AJCC 8th Edition  - Clinical stage from 6/26/2018: Stage 0 (cTis, cN0, cM0) - Signed by Delores Quispe MD on 1/31/2022      HISTORY OF PRESENT ILLNESS: Claire Maddox is a 74 y o  female who has MIS here for follow up and surveillance  She is doing well and feels good  Continues to follow with Dr Tre Simms  No new, changing or concerning lesions  No LAD  REVIEW OF SYSTEMS:  Review of Systems   Constitutional: Negative for appetite change, fatigue, fever and unexpected weight change  HENT:   Negative for lump/mass  Eyes: Negative for icterus  Respiratory: Negative for cough, shortness of breath and wheezing  Cardiovascular: Negative for leg swelling  Gastrointestinal: Negative for abdominal pain, constipation, diarrhea, nausea and vomiting  Genitourinary: Negative for difficulty urinating and hematuria      Musculoskeletal: Negative for arthralgias, gait problem and myalgias  Skin: Negative for itching and rash  No new, changing, or concerning lesions  Neurological: Negative for extremity weakness, gait problem, headaches, light-headedness and numbness  Hematological: Negative for adenopathy          MEDICATIONS:    Current Outpatient Medications:     Alpha-Lipoic Acid 200 MG CAPS, 200 mg, Disp: , Rfl:     ascorbic acid (VITAMIN C) 500 MG tablet, Take 500 mg by mouth daily, Disp: , Rfl:     atorvastatin (LIPITOR) 10 mg tablet, Take 1 tablet by mouth daily, Disp: , Rfl:     Calcium Carbonate 1500 (600 Ca) MG TABS, Take 600 mg by mouth, Disp: , Rfl:     Calcium Carbonate-Vitamin D 600-400 MG-UNIT per tablet, , Disp: , Rfl:     cholecalciferol (VITAMIN D3) 1,000 units tablet, , Disp: , Rfl:     cyanocobalamin (VITAMIN B-12) 1000 MCG tablet, Take by mouth, Disp: , Rfl:     diazePAM 5 MG/5ML SOLN, , Disp: , Rfl:     DULoxetine (CYMBALTA) 30 mg delayed release capsule, Take 1 capsule by mouth daily, Disp: , Rfl:     gabapentin (NEURONTIN) 300 mg capsule, Take 300 mg by mouth, Disp: , Rfl:     lidocaine (LIDODERM) 5 %, , Disp: , Rfl:     meclizine (ANTIVERT) 12 5 MG tablet, , Disp: , Rfl:     metFORMIN (GLUCOPHAGE-XR) 750 mg 24 hr tablet, Take 750 mg by mouth daily with dinner, Disp: , Rfl:     Multiple Vitamin (MULTIVITAMIN ADULT PO), Take 1 capsule by mouth daily, Disp: , Rfl:     Multiple Vitamins-Minerals (Ocuvite Adult 50+) CAPS, , Disp: , Rfl:     Multiple Vitamins-Minerals (PreserVision AREDS 2+Multi Vit) CAPS, , Disp: , Rfl:     Multiple Vitamins-Minerals (WOMENS 50+ MULTI VITAMIN/MIN PO), , Disp: , Rfl:     Naproxen Sodium 220 MG CAPS, Take by mouth 2 (two) times a day, Disp: , Rfl:     omeprazole (PriLOSEC) 40 MG capsule, Take 1 capsule by mouth daily, Disp: , Rfl:     phentermine (ADIPEX-P) 37 5 MG tablet, Take 1 tablet by mouth 2 (two) times a day, Disp: , Rfl:     topiramate (TOPAMAX) 100 mg tablet, Take 100 mg by mouth daily, Disp: , Rfl:      ALLERGIES:  Allergies   Allergen Reactions    Doxycycline Hives, Itching, Rash and Shortness Of Breath    Lidocaine Anaphylaxis, Dizziness, Itching, Palpitations, Rash, Shortness Of Breath, Throat Swelling and Tongue Swelling        ACTIVE PROBLEMS:  Patient Active Problem List   Diagnosis    Malignant melanoma of torso excluding breast ([place] [address] )    Charcot foot due to diabetes mellitus ([place] [address] )          PAST MEDICAL HISTORY:   Past Medical History:   Diagnosis Date    Allergic rhinitis     Arthritis     Benign neoplasm of large bowel     Chronic rhinitis     Colon polyp     Depression     Diabetes mellitus (HCC)     GERD (gastroesophageal reflux disease)     Hypertension     Meniere's disease     Neuropathic pain     Skin cancer     BCC        PAST SURGICAL HISTORY:  Past Surgical History:   Procedure Laterality Date    COLONOSCOPY          SOCIAL HISTORY:  Social History     Socioeconomic History    Marital status: Single     Spouse name: None    Number of children: None    Years of education: None    Highest education level: None   Occupational History    None   Tobacco Use    Smoking status: Never Smoker    Smokeless tobacco: Never Used   Vaping Use    Vaping Use: Never used   Substance and Sexual Activity    Alcohol use: Not Currently    Drug use: Never    Sexual activity: None   Other Topics Concern    None   Social History Narrative    None     Social Determinants of Health     Financial Resource Strain: Not on file   Food Insecurity: Not on file   Transportation Needs: Not on file   Physical Activity: Not on file   Stress: Not on file   Social Connections: Not on file   Intimate Partner Violence: Not on file   Housing Stability: Not on file        FAMILY HISTORY:  Family History   Problem Relation Age of Onset    Hypertension Mother     Arthritis Mother     Hyperlipidemia Mother     Thyroid disease Mother     Squamous cell carcinoma Father     Hypertension Father     Heart disease Father     Breast cancer Maternal Aunt     Throat cancer Maternal Uncle            Objective    PHYSICAL EXAMINATION:   Blood pressure 140/82, pulse (!) 114, temperature (!) 97 2 °F (36 2 °C), temperature source Tympanic, resp  rate 16, height 5' 5" (1 651 m), weight 73 kg (161 lb), SpO2 (!) 89 %  Pain Score: 0-No pain      ECOG Performance Status    Flowsheet Row Most Recent Value   ECOG Performance Status 0 - Fully active, able to carry on all pre-disease performance without restriction          Physical Exam  Constitutional:       General: She is not in acute distress  Appearance: Normal appearance  She is not toxic-appearing  HENT:      Mouth/Throat:      Mouth: Mucous membranes are moist       Pharynx: Oropharynx is clear  Eyes:      General: No scleral icterus  Cardiovascular:      Rate and Rhythm: Normal rate and regular rhythm  Pulses: Normal pulses  Heart sounds: No murmur heard  No friction rub  No gallop  Pulmonary:      Effort: Pulmonary effort is normal  No respiratory distress  Breath sounds: Normal breath sounds  No wheezing or rales  Chest:   Breasts:      Right: No axillary adenopathy or supraclavicular adenopathy  Left: No axillary adenopathy or supraclavicular adenopathy  Abdominal:      General: There is no distension  Palpations: There is no mass  Tenderness: There is no abdominal tenderness  There is no rebound  Musculoskeletal:         General: No swelling or tenderness  Right lower leg: No edema  Left lower leg: No edema  Lymphadenopathy:      Head:      Right side of head: No submandibular, preauricular or posterior auricular adenopathy  Left side of head: No submandibular, preauricular or posterior auricular adenopathy  Cervical: No cervical adenopathy  Right cervical: No superficial or posterior cervical adenopathy       Left cervical: No superficial or posterior cervical adenopathy  Upper Body:      Right upper body: No supraclavicular or axillary adenopathy  Left upper body: No supraclavicular or axillary adenopathy  Lower Body: No right inguinal adenopathy  No left inguinal adenopathy  Skin:     Findings: No rash  Comments: Well healed surgical scar  No evidence of recurrence at primary site  Multiple nevi, no concerning features  Nevus/questionable SK on left lateral breast unchanged and not concerning/   Neurological:      General: No focal deficit present  Mental Status: She is alert and oriented to person, place, and time  Psychiatric:         Mood and Affect: Mood normal          Behavior: Behavior normal          Thought Content: Thought content normal          Judgment: Judgment normal          I reviewed lab data in the chart      Labs drawn this morning - will call with results   WBC   Date Value Ref Range Status   07/25/2022 6 24 4 31 - 10 16 Thousand/uL Final     Hemoglobin   Date Value Ref Range Status   07/25/2022 13 4 11 5 - 15 4 g/dL Final     Platelets   Date Value Ref Range Status   07/25/2022 287 149 - 390 Thousands/uL Final     MCV   Date Value Ref Range Status   07/25/2022 94 82 - 98 fL Final      Potassium   Date Value Ref Range Status   07/25/2022 4 1 3 5 - 5 3 mmol/L Final     Chloride   Date Value Ref Range Status   07/25/2022 108 96 - 108 mmol/L Final     CO2   Date Value Ref Range Status   07/25/2022 28 21 - 32 mmol/L Final     BUN   Date Value Ref Range Status   07/25/2022 23 5 - 25 mg/dL Final     Creatinine   Date Value Ref Range Status   07/25/2022 0 92 0 60 - 1 30 mg/dL Final     Comment:     Standardized to IDMS reference method     Calcium   Date Value Ref Range Status   07/25/2022 9 2 8 4 - 10 2 mg/dL Final     Albumin   Date Value Ref Range Status   07/25/2022 3 9 3 5 - 5 0 g/dL Final     Total Bilirubin   Date Value Ref Range Status   07/25/2022 0 36 0 20 - 1 00 mg/dL Final Alkaline Phosphatase   Date Value Ref Range Status   07/25/2022 79 34 - 104 U/L Final     AST   Date Value Ref Range Status   07/25/2022 20 13 - 39 U/L Final     Comment:     Specimen collection should occur prior to Sulfasalazine administration due to the potential for falsely depressed results  ALT   Date Value Ref Range Status   07/25/2022 17 7 - 52 U/L Final     Comment:     Specimen collection should occur prior to Sulfasalazine administration due to the potential for falsely depressed results  LD   Date Value Ref Range Status   07/25/2022 126 (L) 140 - 271 U/L Final     No results found for: TSH  No results found for: D7GNGQE   No results found for: FREET4      RECENT IMAGING:  No results found  Assessment/Plan  Ms Scot Herrera is a 74 yr female with MIS and multiple nevi here for follow up an surveillance    1  [facility]align[facility] breast [facility] )  2  [facility]  She is doing well and has no clinical evidence of melanoma recurrence  No evidence of new primary melanoma  She had labs done this morning and just came back, all within normal limits or not clinically significant  She will continue close follow-up with Dr Joie Dias  He will return for follow-up with physical exams and labs in 6 months  She knows to call with any issues or concerns prior to her next visit  - CBC and differential; Future  - Comprehensive metabolic panel; Future  - LD,Blood; Future    3   Charcot foot due to diabetes mellitus ([facility] )  Continue to monitor and follow up as planned      Lluvia Florian MD, PhD

## 2022-07-25 NOTE — LETTER
July 25, 2022     Sherice Aguilar MD  [address] [facility]  [address]    Patient: Marty Solis   YOB: 1955   Date of Visit: 7/25/2022       Dear Dr Devota Bence:    Thank you for referring Marty oSlis to me for evaluation  Below are my notes for this consultation  If you have questions, please do not hesitate to call me  I look forward to following your patient along with you  Sincerely,        Connie Rhodes MD        CC: No Recipients  Connie Rhodes MD  7/25/2022 10:46 AM  Sign when Signing Visit  [address] [facility] [address]  [address] [facility]  [phone]     Date of Visit: 7/25/2022  Name: Marty Solis   YOB: 1955        Subjective    VISIT DIAGNOSIS:  Diagnoses and all orders for this visit:    Melanoma in situ of back ([facility] )  -     CBC and differential; Future  -     Comprehensive metabolic panel; Future  -     LD,Blood; Future    Malignant melanoma of torso excluding breast ([facility] )    Charcot foot due to diabetes mellitus [facility])        Oncology History   Malignant melanoma of torso excluding breast ([facility] )   6/26/2018 -  Cancer Staged    Staging form: Melanoma of the Skin, AJCC 8th Edition  - Clinical stage from 6/26/2018: Stage 0 (cTis, cN0, cM0) - Signed by Connie Rhodes MD on 1/31/2022 6/26/2018 Initial Diagnosis    Malignant melanoma of torso excluding breast ([address] )  MIS + margins     8/3/2018 Surgery    Excision - residual melanoma in situ and scar, negative margins        Cancer Staging  Malignant melanoma of torso excluding breast ([facility] )  Staging form: Melanoma of the Skin, AJCC 8th Edition  - Clinical stage from 6/26/2018: Stage 0 (cTis, cN0, cM0) - Signed by Connie Rhodes MD on 1/31/2022      HISTORY OF PRESENT ILLNESS: Marty Solis is a 74 y o  female who has MIS here for follow up and surveillance  She is doing well and feels good    Continues to follow with   [place]  No new, changing or concerning lesions  No LAD  REVIEW OF SYSTEMS:  Review of Systems   Constitutional: Negative for appetite change, fatigue, fever and unexpected weight change  HENT:   Negative for lump/mass  Eyes: Negative for icterus  Respiratory: Negative for cough, shortness of breath and wheezing  Cardiovascular: Negative for leg swelling  Gastrointestinal: Negative for abdominal pain, constipation, diarrhea, nausea and vomiting  Genitourinary: Negative for difficulty urinating and hematuria  Musculoskeletal: Negative for arthralgias, gait problem and myalgias  Skin: Negative for itching and rash  No new, changing, or concerning lesions  Neurological: Negative for extremity weakness, gait problem, headaches, light-headedness and numbness  Hematological: Negative for adenopathy          MEDICATIONS:    Current Outpatient Medications:     Alpha-Lipoic Acid 200 MG CAPS, 200 mg, Disp: , Rfl:     ascorbic acid (VITAMIN C) 500 MG tablet, Take 500 mg by mouth daily, Disp: , Rfl:     atorvastatin (LIPITOR) 10 mg tablet, Take 1 tablet by mouth daily, Disp: , Rfl:     Calcium Carbonate 1500 (600 Ca) MG TABS, Take 600 mg by mouth, Disp: , Rfl:     Calcium Carbonate-Vitamin D 600-400 MG-UNIT per tablet, , Disp: , Rfl:     cholecalciferol (VITAMIN D3) 1,000 units tablet, , Disp: , Rfl:     cyanocobalamin (VITAMIN B-12) 1000 MCG tablet, Take by mouth, Disp: , Rfl:     diazePAM 5 MG/5ML SOLN, , Disp: , Rfl:     DULoxetine (CYMBALTA) 30 mg delayed release capsule, Take 1 capsule by mouth daily, Disp: , Rfl:     gabapentin (NEURONTIN) 300 mg capsule, Take 300 mg by mouth, Disp: , Rfl:     lidocaine (LIDODERM) 5 %, , Disp: , Rfl:     meclizine (ANTIVERT) 12 5 MG tablet, , Disp: , Rfl:     metFORMIN (GLUCOPHAGE-XR) 750 mg 24 hr tablet, Take 750 mg by mouth daily with dinner, Disp: , Rfl:     Multiple Vitamin (MULTIVITAMIN ADULT PO), Take 1 capsule by mouth daily, Disp: , Rfl:     Multiple Vitamins-Minerals (Ocuvite Adult 50+) CAPS, , Disp: , Rfl:     Multiple Vitamins-Minerals (PreserVision AREDS 2+Multi Vit) CAPS, , Disp: , Rfl:     Multiple Vitamins-Minerals (WOMENS 50+ MULTI VITAMIN/MIN PO), , Disp: , Rfl:     Naproxen Sodium 220 MG CAPS, Take by mouth 2 (two) times a day, Disp: , Rfl:     omeprazole (PriLOSEC) 40 MG capsule, Take 1 capsule by mouth daily, Disp: , Rfl:     phentermine (ADIPEX-P) 37 5 MG tablet, Take 1 tablet by mouth 2 (two) times a day, Disp: , Rfl:     topiramate (TOPAMAX) 100 mg tablet, Take 100 mg by mouth daily, Disp: , Rfl:      ALLERGIES:  Allergies   Allergen Reactions    Doxycycline Hives, Itching, Rash and Shortness Of Breath    Lidocaine Anaphylaxis, Dizziness, Itching, Palpitations, Rash, Shortness Of Breath, Throat Swelling and Tongue Swelling        ACTIVE PROBLEMS:  Patient Active Problem List   Diagnosis    Malignant melanoma of torso excluding breast (HCC)    [facility]          PAST MEDICAL HISTORY:   Past Medical History:   Diagnosis Date    Allergic rhinitis     Arthritis     Benign neoplasm of large bowel     Chronic rhinitis     Colon polyp     Depression     Diabetes mellitus (HCC)     GERD (gastroesophageal reflux disease)     Hypertension     Meniere's disease     Neuropathic pain     Skin cancer     BCC        PAST SURGICAL HISTORY:  Past Surgical History:   Procedure Laterality Date    COLONOSCOPY          SOCIAL HISTORY:  Social History     Socioeconomic History    Marital status: Single     Spouse name: None    Number of children: None    Years of education: None    Highest education level: None   Occupational History    None   Tobacco Use    Smoking status: Never Smoker    Smokeless tobacco: Never Used   Vaping Use    Vaping Use: Never used   Substance and Sexual Activity    Alcohol use: Not Currently    Drug use: Never    Sexual activity: None   Other Topics Concern    None   Social History Narrative    None     Social Determinants of Health     Financial Resource Strain: Not on file   Food Insecurity: Not on file   Transportation Needs: Not on file   Physical Activity: Not on file   Stress: Not on file   Social Connections: Not on file   Intimate Partner Violence: Not on file   Housing Stability: Not on file        FAMILY HISTORY:  Family History   Problem Relation Age of Onset    Hypertension Mother     Arthritis Mother     Hyperlipidemia Mother     Thyroid disease Mother     Squamous cell carcinoma Father     Hypertension Father     Heart disease Father     Breast cancer Maternal Aunt     Throat cancer Maternal Uncle            Objective    PHYSICAL EXAMINATION:   Blood pressure 140/82, pulse (!) 114, temperature (!) 97 2 °F (36 2 °C), temperature source Tympanic, resp  rate 16, height 5' 5" (1 651 m), weight 73 kg (161 lb), SpO2 (!) 89 %  Pain Score: 0-No pain      ECOG Performance Status    Flowsheet Row Most Recent Value   ECOG Performance Status 0 - Fully active, able to carry on all pre-disease performance without restriction          Physical Exam  Constitutional:       General: She is not in acute distress  Appearance: Normal appearance  She is not toxic-appearing  HENT:      Mouth/Throat:      Mouth: Mucous membranes are moist       Pharynx: Oropharynx is clear  Eyes:      General: No scleral icterus  Cardiovascular:      Rate and Rhythm: Normal rate and regular rhythm  Pulses: Normal pulses  Heart sounds: No murmur heard  No friction rub  No gallop  Pulmonary:      Effort: Pulmonary effort is normal  No respiratory distress  Breath sounds: Normal breath sounds  No wheezing or rales  Chest:   Breasts:      Right: No axillary adenopathy or supraclavicular adenopathy  Left: No axillary adenopathy or supraclavicular adenopathy  Abdominal:      General: There is no distension  Palpations:  There is no mass       Tenderness: There is no abdominal tenderness  There is no rebound  Musculoskeletal:         General: No swelling or tenderness  Right lower leg: No edema  Left lower leg: No edema  Lymphadenopathy:      Head:      Right side of head: No submandibular, preauricular or posterior auricular adenopathy  Left side of head: No submandibular, preauricular or posterior auricular adenopathy  Cervical: No cervical adenopathy  Right cervical: No superficial or posterior cervical adenopathy  Left cervical: No superficial or posterior cervical adenopathy  Upper Body:      Right upper body: No supraclavicular or axillary adenopathy  Left upper body: No supraclavicular or axillary adenopathy  Lower Body: No right inguinal adenopathy  No left inguinal adenopathy  Skin:     Findings: No rash  Comments: Well healed surgical scar  No evidence of recurrence at primary site  Multiple nevi, no concerning features  Nevus/questionable SK on left lateral breast unchanged and not concerning/   Neurological:      General: No focal deficit present  Mental Status: She is alert and oriented to person, place, and time  Psychiatric:         Mood and Affect: Mood normal          Behavior: Behavior normal          Thought Content: Thought content normal          Judgment: Judgment normal          I reviewed lab data in the chart      Labs drawn this morning - will call with results   WBC   Date Value Ref Range Status   07/25/2022 6 24 4 31 - 10 16 Thousand/uL Final     Hemoglobin   Date Value Ref Range Status   07/25/2022 13 4 11 5 - 15 4 g/dL Final     Platelets   Date Value Ref Range Status   07/25/2022 287 149 - 390 Thousands/uL Final     MCV   Date Value Ref Range Status   07/25/2022 94 82 - 98 fL Final      Potassium   Date Value Ref Range Status   07/25/2022 4 1 3 5 - 5 3 mmol/L Final     Chloride   Date Value Ref Range Status   07/25/2022 108 96 - 108 mmol/L Final CO2   Date Value Ref Range Status   07/25/2022 28 21 - 32 mmol/L Final     BUN   Date Value Ref Range Status   07/25/2022 23 5 - 25 mg/dL Final     Creatinine   Date Value Ref Range Status   07/25/2022 0 92 0 60 - 1 30 mg/dL Final     Comment:     Standardized to IDMS reference method     Calcium   Date Value Ref Range Status   07/25/2022 9 2 8 4 - 10 2 mg/dL Final     Albumin   Date Value Ref Range Status   07/25/2022 3 9 3 5 - 5 0 g/dL Final     Total Bilirubin   Date Value Ref Range Status   07/25/2022 0 36 0 20 - 1 00 mg/dL Final     Alkaline Phosphatase   Date Value Ref Range Status   07/25/2022 79 34 - 104 U/L Final     AST   Date Value Ref Range Status   07/25/2022 20 13 - 39 U/L Final     Comment:     Specimen collection should occur prior to Sulfasalazine administration due to the potential for falsely depressed results  ALT   Date Value Ref Range Status   07/25/2022 17 7 - 52 U/L Final     Comment:     Specimen collection should occur prior to Sulfasalazine administration due to the potential for falsely depressed results  LD   Date Value Ref Range Status   07/25/2022 126 (L) 140 - 271 U/L Final     No results found for: TSH  No results found for: V9VIAAN   No results found for: FREET4      RECENT IMAGING:  No results found  Assessment/Plan  Ms Lauren Sutton is a 74 yr female with MIS and multiple nevi here for follow up an surveillance    1  Malignant melanoma of torso excluding breast ([address] )  2  [facility]  She is doing well and has no clinical evidence of melanoma recurrence  No evidence of new primary melanoma  She had labs done this morning and just came back, all within normal limits or not clinically significant  She will continue close follow-up with Dr Guallpa Mini  He will return for follow-up with physical exams and labs in 6 months  She knows to call with any issues or concerns prior to her next visit        - CBC and differential; Future  - Comprehensive metabolic panel; Future  - LD,Blood; Future    3   Charcot foot due to diabetes mellitus ([facility] )  Continue to monitor and follow up as planned      Yun Brand MD, PhD

## 2022-07-25 NOTE — TELEPHONE ENCOUNTER
Blood work results from today reviewed by Dr Marcin Mcghee  Called and spoke with patient  Discussed blood work results  All questions answered and patient verbalized understanding

## 2023-01-05 ENCOUNTER — TELEPHONE (OUTPATIENT)
Dept: HEMATOLOGY ONCOLOGY | Facility: CLINIC | Age: 68
End: 2023-01-05

## 2023-01-05 NOTE — TELEPHONE ENCOUNTER
Received a phone call from patient  She is currently at [facility] and they are unable to find Dr Augustine Redd account  Spoke with staff member at [facility], provided number [id number], account was found and labs will be drawn today

## 2023-01-06 LAB
ALBUMIN SERPL-MCNC: 4.6 G/DL (ref 3.8–4.8)
ALBUMIN/GLOB SERPL: 2.3 {RATIO} (ref 1.2–2.2)
ALP SERPL-CCNC: 106 IU/L (ref 44–121)
ALT SERPL-CCNC: 21 IU/L (ref 0–32)
AST SERPL-CCNC: 22 IU/L (ref 0–40)
BASOPHILS # BLD AUTO: 0.1 X10E3/UL (ref 0–0.2)
BASOPHILS NFR BLD AUTO: 1 %
BILIRUB SERPL-MCNC: 0.3 MG/DL (ref 0–1.2)
BUN SERPL-MCNC: 18 MG/DL (ref 8–27)
BUN/CREAT SERPL: 22 (ref 12–28)
CALCIUM SERPL-MCNC: 9.8 MG/DL (ref 8.7–10.3)
CHLORIDE SERPL-SCNC: 105 MMOL/L (ref 96–106)
CO2 SERPL-SCNC: 22 MMOL/L (ref 20–29)
CREAT SERPL-MCNC: 0.83 MG/DL (ref 0.57–1)
EGFR: 78 ML/MIN/1.73
EOSINOPHIL # BLD AUTO: 0.3 X10E3/UL (ref 0–0.4)
EOSINOPHIL NFR BLD AUTO: 5 %
ERYTHROCYTE [DISTWIDTH] IN BLOOD BY AUTOMATED COUNT: 12.4 % (ref 11.7–15.4)
GLOBULIN SER-MCNC: 2 G/DL (ref 1.5–4.5)
GLUCOSE SERPL-MCNC: 108 MG/DL (ref 70–99)
HCT VFR BLD AUTO: 41.5 % (ref 34–46.6)
HGB BLD-MCNC: 14.4 G/DL (ref 11.1–15.9)
IMM GRANULOCYTES # BLD: 0 X10E3/UL (ref 0–0.1)
IMM GRANULOCYTES NFR BLD: 0 %
LDH SERPL-CCNC: 159 IU/L (ref 119–226)
LYMPHOCYTES # BLD AUTO: 1.9 X10E3/UL (ref 0.7–3.1)
LYMPHOCYTES NFR BLD AUTO: 28 %
MCH RBC QN AUTO: 30.8 PG (ref 26.6–33)
MCHC RBC AUTO-ENTMCNC: 34.7 G/DL (ref 31.5–35.7)
MCV RBC AUTO: 89 FL (ref 79–97)
MONOCYTES # BLD AUTO: 0.4 X10E3/UL (ref 0.1–0.9)
MONOCYTES NFR BLD AUTO: 6 %
NEUTROPHILS # BLD AUTO: 4 X10E3/UL (ref 1.4–7)
NEUTROPHILS NFR BLD AUTO: 60 %
PLATELET # BLD AUTO: 307 X10E3/UL (ref 150–450)
POTASSIUM SERPL-SCNC: 4.7 MMOL/L (ref 3.5–5.2)
PROT SERPL-MCNC: 6.6 G/DL (ref 6–8.5)
RBC # BLD AUTO: 4.68 X10E6/UL (ref 3.77–5.28)
SODIUM SERPL-SCNC: 143 MMOL/L (ref 134–144)
WBC # BLD AUTO: 6.7 X10E3/UL (ref 3.4–10.8)

## 2023-01-25 ENCOUNTER — TELEPHONE (OUTPATIENT)
Dept: HEMATOLOGY ONCOLOGY | Facility: CLINIC | Age: 68
End: 2023-01-25

## 2023-01-25 ENCOUNTER — TELEPHONE (OUTPATIENT)
Dept: OTHER | Facility: OTHER | Age: 68
End: 2023-01-25

## 2023-01-25 NOTE — TELEPHONE ENCOUNTER
I called and spoke with the pt about her new appointment date which is 2/1/2023 at 1:00pm pt agreed

## 2023-02-01 ENCOUNTER — OFFICE VISIT (OUTPATIENT)
Dept: HEMATOLOGY ONCOLOGY | Facility: CLINIC | Age: 68
End: 2023-02-01

## 2023-02-01 VITALS
WEIGHT: 166 LBS | OXYGEN SATURATION: 94 % | RESPIRATION RATE: 18 BRPM | HEIGHT: 65 IN | HEART RATE: 74 BPM | DIASTOLIC BLOOD PRESSURE: 80 MMHG | BODY MASS INDEX: 27.66 KG/M2 | SYSTOLIC BLOOD PRESSURE: 135 MMHG

## 2023-02-01 DIAGNOSIS — C43.59 MALIGNANT MELANOMA OF TORSO EXCLUDING BREAST (HCC): Primary | ICD-10-CM

## 2023-02-01 DIAGNOSIS — E11.610 CHARCOT FOOT DUE TO DIABETES MELLITUS (HCC): ICD-10-CM

## 2023-02-01 NOTE — PROGRESS NOTES
[facility] [address]  [place] [address]  [phone]  [phone]     Date of Visit: 2/1/2023  Name: Shirley Dale   YOB: 1955        Subjective    VISIT DIAGNOSIS:  Diagnoses and all orders for this visit:    Malignant melanoma of torso excluding breast ([facility] )  -     CBC and differential; Future  -     Comprehensive metabolic panel; Future  -     LD,Blood; Future    Charcot foot due to diabetes mellitus [facility])        Oncology History   Malignant melanoma of torso excluding breast ([facility] )   6/26/2018 -  Cancer Staged    Staging form: Melanoma of the Skin, AJCC 8th Edition  - Clinical stage from 6/26/2018: Stage 0 (cTis, cN0, cM0) - Signed by Shikha Vázquez MD on 1/31/2022 6/26/2018 Initial Diagnosis    Malignant melanoma of torso excluding breast ([facility] )  MIS + margins     8/3/2018 Surgery    Excision - residual melanoma in situ and scar, negative margins        Cancer Staging   Malignant melanoma of torso excluding breast ([facility] )  Staging form: Melanoma of the Skin, AJCC 8th Edition  - Clinical stage from 6/26/2018: Stage 0 (cTis, cN0, cM0) - Signed by Shikha Vázquez MD on 1/31/2022     [No matching plan found]     HISTORY OF PRESENT ILLNESS: Shirley Dale is a 74 y o  female who has a history of melanoma in situ here for follow-up and surveillance  She used to follow with Dr Elsy Link but was unable to see her at her recent visit and was assigned to a new dermatologist   She had used 5-FU cream for precancerous lesions on her face and developed severe rash/erythema, and sores which were bleeding  She stopped using the 5-FU cream and used ointment and cream with final resolution of the areas just prior to [place]  Everything is resolved at this time and her skin is clear  She has a few spots on her bilateral forearms that she is concerned about    Also worried about a pain in her right lateral breast that she gets sometimes, although she is not sure if it due to a new workout routine  She just got prescriptions for mammograms and breast ultrasound  She denies any new, changing, or concerning skin lesions  Denies any lymphadenopathy  REVIEW OF SYSTEMS:  Review of Systems   Constitutional: Negative for appetite change, fatigue, fever and unexpected weight change  HENT:   Negative for lump/mass  Eyes: Negative for icterus  Respiratory: Negative for cough, shortness of breath and wheezing  Cardiovascular: Negative for leg swelling  Gastrointestinal: Negative for abdominal pain, constipation, diarrhea, nausea and vomiting  Genitourinary: Negative for difficulty urinating and hematuria  Musculoskeletal: Negative for arthralgias, gait problem and myalgias  Right sided breast pain - intermittent   Skin: Negative for itching and rash  Three lesion on bilateral forearms that are small and erythematous  Neurological: Negative for extremity weakness, gait problem, headaches, light-headedness and numbness  Hematological: Negative for adenopathy          MEDICATIONS:    Current Outpatient Medications:   •  Alpha-Lipoic Acid 200 MG CAPS, 200 mg, Disp: , Rfl:   •  ascorbic acid (VITAMIN C) 500 MG tablet, Take 500 mg by mouth daily, Disp: , Rfl:   •  atorvastatin (LIPITOR) 10 mg tablet, Take 1 tablet by mouth daily, Disp: , Rfl:   •  Calcium Carbonate 1500 (600 Ca) MG TABS, Take 600 mg by mouth, Disp: , Rfl:   •  Calcium Carbonate-Vitamin D 600-400 MG-UNIT per tablet, , Disp: , Rfl:   •  cholecalciferol (VITAMIN D3) 1,000 units tablet, , Disp: , Rfl:   •  cyanocobalamin (VITAMIN B-12) 1000 MCG tablet, Take by mouth, Disp: , Rfl:   •  diazePAM 5 MG/5ML SOLN, , Disp: , Rfl:   •  DULoxetine (CYMBALTA) 30 mg delayed release capsule, Take 1 capsule by mouth daily, Disp: , Rfl:   •  gabapentin (NEURONTIN) 300 mg capsule, Take 300 mg by mouth, Disp: , Rfl:   •  lidocaine (LIDODERM) 5 %, , Disp: , Rfl:   •  meclizine (ANTIVERT) 12 5 MG tablet, , Disp: , Rfl:   •  metFORMIN (GLUCOPHAGE-XR) 750 mg 24 hr tablet, Take 750 mg by mouth daily with dinner, Disp: , Rfl:   •  Multiple Vitamin (MULTIVITAMIN ADULT PO), Take 1 capsule by mouth daily, Disp: , Rfl:   •  Multiple Vitamins-Minerals (Ocuvite Adult 50+) CAPS, , Disp: , Rfl:   •  Multiple Vitamins-Minerals (PreserVision AREDS 2+Multi Vit) CAPS, , Disp: , Rfl:   •  Multiple Vitamins-Minerals (WOMENS 50+ MULTI VITAMIN/MIN PO), , Disp: , Rfl:   •  Naproxen Sodium 220 MG CAPS, Take by mouth 2 (two) times a day, Disp: , Rfl:   •  omeprazole (PriLOSEC) 40 MG capsule, Take 1 capsule by mouth daily, Disp: , Rfl:   •  phentermine (ADIPEX-P) 37 5 MG tablet, Take 1 tablet by mouth 2 (two) times a day, Disp: , Rfl:   •  topiramate (TOPAMAX) 100 mg tablet, Take 100 mg by mouth daily, Disp: , Rfl:      ALLERGIES:  Allergies   Allergen Reactions   • Doxycycline Hives, Itching, Rash and Shortness Of Breath   • Lidocaine Anaphylaxis, Dizziness, Itching, Palpitations, Rash, Shortness Of Breath, Throat Swelling and Tongue Swelling        ACTIVE PROBLEMS:  Patient Active Problem List   Diagnosis   • Malignant melanoma of torso excluding breast (HCC)   • Charcot foot due to diabetes mellitus ([facility] )          PAST MEDICAL HISTORY:   Past Medical History:   Diagnosis Date   • Allergic rhinitis    • Arthritis    • Benign neoplasm of large bowel    • Chronic rhinitis    • Colon polyp    • Depression    • Diabetes mellitus (HCC)    • GERD (gastroesophageal reflux disease)    • Hypertension    • Meniere's disease    • Neuropathic pain    • Skin cancer     [facility]        PAST SURGICAL HISTORY:  Past Surgical History:   Procedure Laterality Date   • COLONOSCOPY          SOCIAL HISTORY:  Social History     Socioeconomic History   • Marital status: Single     Spouse name: None   • Number of children: None   • Years of education: None   • Highest education level: None   Occupational History   • None   Tobacco Use   • Smoking status: Never   • Smokeless tobacco: Never   Vaping Use   • Vaping Use: Never used   Substance and Sexual Activity   • Alcohol use: Not Currently   • Drug use: Never   • Sexual activity: None   Other Topics Concern   • None   Social History Narrative   • None     Social Determinants of Health     Financial Resource Strain: Not on file   Food Insecurity: Not on file   Transportation Needs: Not on file   Physical Activity: Not on file   Stress: Not on file   Social Connections: Not on file   Intimate Partner Violence: Not on file   Housing Stability: Not on file        FAMILY HISTORY:  Family History   Problem Relation Age of Onset   • Hypertension Mother    • Arthritis Mother    • Hyperlipidemia Mother    • Thyroid disease Mother    • Squamous cell carcinoma Father    • Hypertension Father    • Heart disease Father    • Breast cancer Maternal Aunt    • Throat cancer Maternal Uncle            Objective    PHYSICAL EXAMINATION:   Blood pressure 135/80, pulse 74, resp  rate 18, height 5' 5" (1 651 m), weight 75 3 kg (166 lb), SpO2 94 %  Pain Score: 0-No pain     ECOG Performance Status    Flowsheet Row Most Recent Value   ECOG Performance Status 0 - Fully active, able to carry on all pre-disease performance without restriction             Physical Exam  Constitutional:       General: She is not in acute distress  Appearance: Normal appearance  She is not toxic-appearing  HENT:      Right Ear: External ear normal       Left Ear: External ear normal       Ears:      Comments: Small area of dry scabbed skin in left inner ear area     Mouth/Throat:      Mouth: Mucous membranes are moist       Pharynx: Oropharynx is clear  Eyes:      General: No scleral icterus  Cardiovascular:      Rate and Rhythm: Normal rate and regular rhythm  Pulses: Normal pulses  Heart sounds: No murmur heard  No friction rub  No gallop  Pulmonary:      Effort: Pulmonary effort is normal  No respiratory distress  Breath sounds: Normal breath sounds  No wheezing or rales  Chest:      Chest wall: No mass, swelling or tenderness  Breasts:     Right: No inverted nipple, mass, skin change or tenderness  Left: Normal  No inverted nipple, mass, skin change or tenderness  Abdominal:      General: There is no distension  Palpations: There is no mass  Tenderness: There is no abdominal tenderness  There is no rebound  Musculoskeletal:         General: No swelling or tenderness  Right lower leg: No edema  Left lower leg: No edema  Lymphadenopathy:      Head:      Right side of head: No submandibular, preauricular or posterior auricular adenopathy  Left side of head: No submandibular, preauricular or posterior auricular adenopathy  Cervical: No cervical adenopathy  Right cervical: No superficial or posterior cervical adenopathy  Left cervical: No superficial or posterior cervical adenopathy  Upper Body:      Right upper body: No supraclavicular, axillary or pectoral adenopathy  Left upper body: No supraclavicular, axillary or pectoral adenopathy  Lower Body: No right inguinal adenopathy  No left inguinal adenopathy  Skin:     Findings: No erythema or rash  Comments: Well healed surgical scar  No evidence of recurrence at primary site  One small punctate arvin on right forearm above wrist and two small punctate marks on left forearm - consistent with dry skin/scratch and possible SCC  No concerning features   Neurological:      General: No focal deficit present  Mental Status: She is alert and oriented to person, place, and time  Psychiatric:         Mood and Affect: Mood normal          Behavior: Behavior normal          Thought Content: Thought content normal          Judgment: Judgment normal          I reviewed lab data in the chart      WBC   Date Value Ref Range Status   07/25/2022 6 24 4 31 - 10 16 Thousand/uL Final     White Blood Cell Count Date Value Ref Range Status   01/05/2023 6 7 3 4 - 10 8 x10E3/uL Final     Hemoglobin   Date Value Ref Range Status   01/05/2023 14 4 11 1 - 15 9 g/dL Final   07/25/2022 13 4 11 5 - 15 4 g/dL Final     Platelet Count   Date Value Ref Range Status   01/05/2023 307 150 - 450 x10E3/uL Final     Platelets   Date Value Ref Range Status   07/25/2022 287 149 - 390 Thousands/uL Final     MCV   Date Value Ref Range Status   01/05/2023 89 79 - 97 fL Final   07/25/2022 94 82 - 98 fL Final      Potassium   Date Value Ref Range Status   01/05/2023 4 7 3 5 - 5 2 mmol/L Final   07/25/2022 4 1 3 5 - 5 3 mmol/L Final     Chloride   Date Value Ref Range Status   01/05/2023 105 96 - 106 mmol/L Final   07/25/2022 108 96 - 108 mmol/L Final     CO2   Date Value Ref Range Status   01/05/2023 22 20 - 29 mmol/L Final   07/25/2022 28 21 - 32 mmol/L Final     BUN   Date Value Ref Range Status   01/05/2023 18 8 - 27 mg/dL Final   07/25/2022 23 5 - 25 mg/dL Final     Creatinine   Date Value Ref Range Status   01/05/2023 0 83 0 57 - 1 00 mg/dL Final   07/25/2022 0 92 0 60 - 1 30 mg/dL Final     Comment:     Standardized to IDMS reference method     Glucose, Random   Date Value Ref Range Status   01/05/2023 108 (H) 70 - 99 mg/dL Final     Calcium   Date Value Ref Range Status   07/25/2022 9 2 8 4 - 10 2 mg/dL Final     Albumin   Date Value Ref Range Status   01/05/2023 4 6 3 8 - 4 8 g/dL Final   07/25/2022 3 9 3 5 - 5 0 g/dL Final     TOTAL BILIRUBIN   Date Value Ref Range Status   01/05/2023 0 3 0 0 - 1 2 mg/dL Final     Total Bilirubin   Date Value Ref Range Status   07/25/2022 0 36 0 20 - 1 00 mg/dL Final     Alkaline Phosphatase   Date Value Ref Range Status   07/25/2022 79 34 - 104 U/L Final     AST   Date Value Ref Range Status   01/05/2023 22 0 - 40 IU/L Final   07/25/2022 20 13 - 39 U/L Final     Comment:     Specimen collection should occur prior to Sulfasalazine administration due to the potential for falsely depressed results  ALT   Date Value Ref Range Status   01/05/2023 21 0 - 32 IU/L Final   07/25/2022 17 7 - 52 U/L Final     Comment:     Specimen collection should occur prior to Sulfasalazine administration due to the potential for falsely depressed results  LD   Date Value Ref Range Status   07/25/2022 126 (L) 140 - 271 U/L Final     LDH   Date Value Ref Range Status   01/05/2023 159 119 - 226 IU/L Final     No results found for: TSH  No results found for: [id number]   No results found for: FREET4      RECENT IMAGING:  No results found  Assessment/Plan  Ms David Roman is a 74 yr female with melanoma of the torso here for follow-up and surveillance  1  Malignant [facility] excluding breast ([facility] )  Doing well and there is no clinical evidence of melanoma recurrence or metastasis  No clinical evidence of new primary melanoma was  Some skin lesions that may be squamous cell or just areas of irritation and she will follow-up with her dermatologist to have these evaluated  She will continue with close dermatology follow-up and is due in the next month or 2  We discussed that her labs look good and are within normal limits and if not are not clinically significant  No beast mass/nodule associated with pain and tenderness experienced intermittently in the right breast   She will proceed with mammogrma and US as planned  She knows to continue with safe sun practices  She knows to call with issues or concerns prior to her next visit  She will return in 6 months for continued surveillance  Orders placed for blood work at that time and prescription provided  - CBC and differential; Future  - Comprehensive metabolic panel; Future  - LD,Blood; Future    2   Charcot foot due to diabetes mellitus ([facility] )  Her foot is stable at this time and continues to follow closely with her podiatrist   She is currently on metformin for her diabetes however her doctors are thinking about switching her over to another medication  She states it might be better for her stomach as well  Continue plan as per primary care doctor  Return in about 6 months (around 8/1/2023) for Office Visit, labs       Mary Fuentes MD, PhD

## 2023-02-01 NOTE — LETTER
February 1, 2023     Donna Abarca MD  [address] [facility]  2 / [address]    Patient: Job Cano   YOB: 1955   Date of Visit: 2/1/2023       Dear Dr Chdad Scott:    Thank you for referring Job Cano to me for evaluation  Below are my notes for this consultation  If you have questions, please do not hesitate to call me  I look forward to following your patient along with you  Sincerely,        Pablito Galvan MD        CC: No Recipients  Pablito Galvan MD  2/1/2023  2:01 PM  Sign when Signing Visit  [address]  [phone]  [phone]     Date of Visit: 2/1/2023  Name: Job Cano   YOB: 1955        Subjective    VISIT DIAGNOSIS:  Diagnoses and all orders for this visit:    Malignant melanoma of torso excluding breast ([facility] [address] )  -     CBC and differential; Future  -     Comprehensive metabolic panel; Future  -     LD,Blood; Future    Charcot foot due to diabetes mellitus [facility])        Oncology History   Malignant melanoma of torso excluding breast ([address] )   6/26/2018 -  Cancer Staged    Staging form: Melanoma of the Skin, AJCC 8th Edition  - Clinical stage from 6/26/2018: Stage 0 (cTis, cN0, cM0) - Signed by Pablito Galvan MD on 1/31/2022 6/26/2018 Initial Diagnosis    Malignant melanoma of torso excluding breast ([address] )  MIS + margins     8/3/2018 Surgery    Excision - residual melanoma in situ and scar, negative margins        Cancer Staging   Malignant melanoma of torso excluding breast ([facility] [address] )  Staging form: Melanoma of the Skin, AJCC 8th Edition  - Clinical stage from 6/26/2018: Stage 0 (cTis, cN0, cM0) - Signed by Pablito Galvan MD on 1/31/2022     [No matching plan found]     HISTORY OF PRESENT ILLNESS: Job Cano is a 74 y o  female who has a history of melanoma in situ here for follow-up and surveillance        She used to follow with Dr Roshan Meyer but was unable to see her at her recent visit and was assigned to a new dermatologist   She had used 5-FU cream for precancerous lesions on her face and developed severe rash/erythema, and sores which were bleeding  She stopped using the 5-FU cream and used ointment and cream with final resolution of the areas just prior to [place]  Everything is resolved at this time and her skin is clear  She has a few spots on her bilateral forearms that she is concerned about  Also worried about a pain in her right lateral breast that she gets sometimes, although she is not sure if it due to a new workout routine  She just got prescriptions for mammograms and breast ultrasound  She denies any new, changing, or concerning skin lesions  Denies any lymphadenopathy  REVIEW OF SYSTEMS:  Review of Systems   Constitutional: Negative for appetite change, fatigue, fever and unexpected weight change  HENT:   Negative for lump/mass  Eyes: Negative for icterus  Respiratory: Negative for cough, shortness of breath and wheezing  Cardiovascular: Negative for leg swelling  Gastrointestinal: Negative for abdominal pain, constipation, diarrhea, nausea and vomiting  Genitourinary: Negative for difficulty urinating and hematuria  Musculoskeletal: Negative for arthralgias, gait problem and myalgias  Right sided breast pain - intermittent   Skin: Negative for itching and rash  Three lesion on bilateral forearms that are small and erythematous  Neurological: Negative for extremity weakness, gait problem, headaches, light-headedness and numbness  Hematological: Negative for adenopathy          MEDICATIONS:    Current Outpatient Medications:   •  Alpha-Lipoic Acid 200 MG CAPS, 200 mg, Disp: , Rfl:   •  ascorbic acid (VITAMIN C) 500 MG tablet, Take 500 mg by mouth daily, Disp: , Rfl:   •  atorvastatin (LIPITOR) 10 mg tablet, Take 1 tablet by mouth daily, Disp: , Rfl:   •  Calcium Carbonate 1500 (600 Ca) MG TABS, Take 600 mg by mouth, Disp: , Rfl:   •  Calcium Carbonate-Vitamin D 600-400 MG-UNIT per tablet, , Disp: , Rfl:   •  cholecalciferol (VITAMIN D3) 1,000 units tablet, , Disp: , Rfl:   •  cyanocobalamin (VITAMIN B-12) 1000 MCG tablet, Take by mouth, Disp: , Rfl:   •  diazePAM 5 MG/5ML SOLN, , Disp: , Rfl:   •  DULoxetine (CYMBALTA) 30 mg delayed release capsule, Take 1 capsule by mouth daily, Disp: , Rfl:   •  gabapentin (NEURONTIN) 300 mg capsule, Take 300 mg by mouth, Disp: , Rfl:   •  lidocaine (LIDODERM) 5 %, , Disp: , Rfl:   •  meclizine (ANTIVERT) 12 5 MG tablet, , Disp: , Rfl:   •  metFORMIN (GLUCOPHAGE-XR) 750 mg 24 hr tablet, Take 750 mg by mouth daily with dinner, Disp: , Rfl:   •  Multiple Vitamin (MULTIVITAMIN ADULT PO), Take 1 capsule by mouth daily, Disp: , Rfl:   •  Multiple Vitamins-Minerals (Ocuvite Adult 50+) CAPS, , Disp: , Rfl:   •  Multiple Vitamins-Minerals (PreserVision AREDS 2+Multi Vit) CAPS, , Disp: , Rfl:   •  Multiple Vitamins-Minerals (WOMENS 50+ MULTI VITAMIN/MIN PO), , Disp: , Rfl:   •  Naproxen Sodium 220 MG CAPS, Take by mouth 2 (two) times a day, Disp: , Rfl:   •  omeprazole (PriLOSEC) 40 MG capsule, Take 1 capsule by mouth daily, Disp: , Rfl:   •  phentermine (ADIPEX-P) 37 5 MG tablet, Take 1 tablet by mouth 2 (two) times a day, Disp: , Rfl:   •  topiramate (TOPAMAX) 100 mg tablet, Take 100 mg by mouth daily, Disp: , Rfl:      ALLERGIES:  Allergies   Allergen Reactions   • Doxycycline Hives, Itching, Rash and Shortness Of Breath   • Lidocaine Anaphylaxis, Dizziness, Itching, Palpitations, Rash, Shortness Of Breath, Throat Swelling and Tongue Swelling        ACTIVE PROBLEMS:  Patient Active Problem List   Diagnosis   • Malignant melanoma of torso excluding breast ([facility] [address] )   • Charcot foot due to diabetes mellitus ([facility] )          PAST MEDICAL HISTORY:   Past Medical History:   Diagnosis Date   • Allergic rhinitis    • Arthritis    • Benign neoplasm of large bowel    • Chronic rhinitis    • Colon polyp    • Depression    • Diabetes mellitus (HCC)    • GERD (gastroesophageal reflux disease)    • Hypertension    • Meniere's disease    • Neuropathic pain    • Skin cancer     BCC        PAST SURGICAL HISTORY:  Past Surgical History:   Procedure Laterality Date   • COLONOSCOPY          SOCIAL HISTORY:  Social History     Socioeconomic History   • Marital status: Single     Spouse name: None   • Number of children: None   • Years of education: None   • Highest education level: None   Occupational History   • None   Tobacco Use   • Smoking status: Never   • Smokeless tobacco: Never   Vaping Use   • Vaping Use: Never used   Substance and Sexual Activity   • Alcohol use: Not Currently   • Drug use: Never   • Sexual activity: None   Other Topics Concern   • None   Social History Narrative   • None     Social Determinants of Health     Financial Resource Strain: Not on file   Food Insecurity: Not on file   Transportation Needs: Not on file   Physical Activity: Not on file   Stress: Not on file   Social Connections: Not on file   Intimate Partner Violence: Not on file   Housing Stability: Not on file        FAMILY HISTORY:  Family History   Problem Relation Age of Onset   • Hypertension Mother    • Arthritis Mother    • Hyperlipidemia Mother    • Thyroid disease Mother    • Squamous cell carcinoma Father    • Hypertension Father    • Heart disease Father    • Breast cancer Maternal Aunt    • Throat cancer Maternal Uncle            Objective    PHYSICAL EXAMINATION:   Blood pressure 135/80, pulse 74, resp  rate 18, height 5' 5" (1 651 m), weight 75 3 kg (166 lb), SpO2 94 %  Pain Score: 0-No pain     ECOG Performance Status    Flowsheet Row Most Recent Value   ECOG Performance Status 0 - Fully active, able to carry on all pre-disease performance without restriction             Physical Exam  Constitutional:       General: She is not in acute distress  Appearance: Normal appearance   She is not toxic-appearing  HENT:      Right Ear: External ear normal       Left Ear: External ear normal       Ears:      Comments: Small area of dry scabbed skin in left inner ear area     Mouth/Throat:      Mouth: Mucous membranes are moist       Pharynx: Oropharynx is clear  Eyes:      General: No scleral icterus  Cardiovascular:      Rate and Rhythm: Normal rate and regular rhythm  Pulses: Normal pulses  Heart sounds: No murmur heard  No friction rub  No gallop  Pulmonary:      Effort: Pulmonary effort is normal  No respiratory distress  Breath sounds: Normal breath sounds  No wheezing or rales  Chest:      Chest wall: No mass, swelling or tenderness  Breasts:     Right: No inverted nipple, mass, skin change or tenderness  Left: Normal  No inverted nipple, mass, skin change or tenderness  Abdominal:      General: There is no distension  Palpations: There is no mass  Tenderness: There is no abdominal tenderness  There is no rebound  Musculoskeletal:         General: No swelling or tenderness  Right lower leg: No edema  Left lower leg: No edema  Lymphadenopathy:      Head:      Right side of head: No submandibular, preauricular or posterior auricular adenopathy  Left side of head: No submandibular, preauricular or posterior auricular adenopathy  Cervical: No cervical adenopathy  Right cervical: No superficial or posterior cervical adenopathy  Left cervical: No superficial or posterior cervical adenopathy  Upper Body:      Right upper body: No supraclavicular, axillary or pectoral adenopathy  Left upper body: No supraclavicular, axillary or pectoral adenopathy  Lower Body: No right inguinal adenopathy  No left inguinal adenopathy  Skin:     Findings: No erythema or rash  Comments: Well healed surgical scar  No evidence of recurrence at primary site   One small punctate arvin on right forearm above wrist and two small punctate marks on left forearm - consistent with dry skin/scratch and possible SCC  No concerning features   Neurological:      General: No focal deficit present  Mental Status: She is alert and oriented to person, place, and time  Psychiatric:         Mood and Affect: Mood normal          Behavior: Behavior normal          Thought Content: Thought content normal          Judgment: Judgment normal          I reviewed lab data in the chart      WBC   Date Value Ref Range Status   07/25/2022 6 24 4 31 - 10 16 Thousand/uL Final     White Blood Cell Count   Date Value Ref Range Status   01/05/2023 6 7 3 4 - 10 8 x10E3/uL Final     Hemoglobin   Date Value Ref Range Status   01/05/2023 14 4 11 1 - 15 9 g/dL Final   07/25/2022 13 4 11 5 - 15 4 g/dL Final     Platelet Count   Date Value Ref Range Status   01/05/2023 307 150 - 450 x10E3/uL Final     Platelets   Date Value Ref Range Status   07/25/2022 287 149 - 390 Thousands/uL Final     MCV   Date Value Ref Range Status   01/05/2023 89 79 - 97 fL Final   07/25/2022 94 82 - 98 fL Final      Potassium   Date Value Ref Range Status   01/05/2023 4 7 3 5 - 5 2 mmol/L Final   07/25/2022 4 1 3 5 - 5 3 mmol/L Final     Chloride   Date Value Ref Range Status   01/05/2023 105 96 - 106 mmol/L Final   07/25/2022 108 96 - 108 mmol/L Final     CO2   Date Value Ref Range Status   01/05/2023 22 20 - 29 mmol/L Final   07/25/2022 28 21 - 32 mmol/L Final     BUN   Date Value Ref Range Status   01/05/2023 18 8 - 27 mg/dL Final   07/25/2022 23 5 - 25 mg/dL Final     Creatinine   Date Value Ref Range Status   01/05/2023 0 83 0 57 - 1 00 mg/dL Final   07/25/2022 0 92 0 60 - 1 30 mg/dL Final     Comment:     Standardized to IDMS reference method     Glucose, Random   Date Value Ref Range Status   01/05/2023 108 (H) 70 - 99 mg/dL Final     Calcium   Date Value Ref Range Status   07/25/2022 9 2 8 4 - 10 2 mg/dL Final     Albumin   Date Value Ref Range Status   01/05/2023 4 6 3 8 - 4 8 g/dL Final 07/25/2022 3 9 3 5 - 5 0 g/dL Final     TOTAL BILIRUBIN   Date Value Ref Range Status   01/05/2023 0 3 0 0 - 1 2 mg/dL Final     Total Bilirubin   Date Value Ref Range Status   07/25/2022 0 36 0 20 - 1 00 mg/dL Final     Alkaline Phosphatase   Date Value Ref Range Status   07/25/2022 79 34 - 104 U/L Final     AST   Date Value Ref Range Status   01/05/2023 22 0 - 40 IU/L Final   07/25/2022 20 13 - 39 U/L Final     Comment:     Specimen collection should occur prior to Sulfasalazine administration due to the potential for falsely depressed results  ALT   Date Value Ref Range Status   01/05/2023 21 0 - 32 IU/L Final   07/25/2022 17 7 - 52 U/L Final     Comment:     Specimen collection should occur prior to Sulfasalazine administration due to the potential for falsely depressed results  LD   Date Value Ref Range Status   07/25/2022 126 (L) 140 - 271 U/L Final     LDH   Date Value Ref Range Status   01/05/2023 159 119 - 226 IU/L Final     No results found for: TSH  No results found for: [id number]   No results found for: FREET4      RECENT IMAGING:  No results found  Assessment/Plan  Ms Corey Root is a 74 yr female with melanoma of the torso here for follow-up and surveillance  1  Malignant melanoma of torso excluding breast ([address] )  Doing well and there is no clinical evidence of melanoma recurrence or metastasis  No clinical evidence of new primary melanoma was  Some skin lesions that may be squamous cell or just areas of irritation and she will follow-up with her dermatologist to have these evaluated  She will continue with close dermatology follow-up and is due in the next month or 2  We discussed that her labs look good and are within normal limits and if not are not clinically significant  No beast mass/nodule associated with pain and tenderness experienced intermittently in the right breast   She will proceed with mammogrma and US as planned      She knows to continue with safe sun practices  She knows to call with issues or concerns prior to her next visit  She will return in 6 months for continued surveillance  Orders placed for blood work at that time and prescription provided  - CBC and differential; Future  - Comprehensive metabolic panel; Future  - LD,Blood; Future    2  Charcot foot due to diabetes mellitus ([facility] [address] )  Her foot is stable at this time and continues to follow closely with her podiatrist   She is currently on metformin for her diabetes however her doctors are thinking about switching her over to another medication  She states it might be better for her stomach as well  Continue plan as per primary care doctor  Return in about 6 months (around 8/1/2023) for Office Visit, labs       Long Acosta MD, PhD

## 2023-06-08 ENCOUNTER — TELEPHONE (OUTPATIENT)
Dept: HEMATOLOGY ONCOLOGY | Facility: CLINIC | Age: 68
End: 2023-06-08

## 2023-06-08 NOTE — TELEPHONE ENCOUNTER
I called [facility] regarding an appointment that they have scheduled with Dr Kathryn Perry scheduled on 8/2/2023 I left a voicemail explaining to patient that the provider will be out of the office on this date and will need to reschedule the visit  I encouraged patient to call [place] at [phone] to reschedule

## 2023-06-12 ENCOUNTER — TELEPHONE (OUTPATIENT)
Dept: HEMATOLOGY ONCOLOGY | Facility: CLINIC | Age: 68
End: 2023-06-12

## 2023-06-12 NOTE — TELEPHONE ENCOUNTER
Appointment Change  Cancel, Reschedule, Change to Virtual      Who are you speaking with? Patient   If it is not the patient, are they listed on an active communication consent form? N/A   Which provider is the appointment scheduled with? Dr Genie Rajan   When is the appointment scheduled? Please list date and time 8/2/23 @ 1pm   At which location is the appointment scheduled to take place? [facility]   Was the appointment rescheduled or changed from an in person visit to a virtual visit? If so, please list the details of the change  8/3/23 @ 1pm   What is the reason for the appointment change? Dr Genie Rajan will be out of the office   Was [facility] transport scheduled for this visit? no   Does [facility] transport need to be scheduled for the new visit (if applicable) no   Does the patient need an infusion appointment rescheduled? no   Does the patient have an infusion appointment scheduled? If so, when? no   Is the patient undergoing chemotherapy? no   Was the no-show policy reviewed for appointments being changed with less then 24 hours of notice?  Yes

## 2023-07-28 ENCOUNTER — TELEPHONE (OUTPATIENT)
Dept: HEMATOLOGY ONCOLOGY | Facility: CLINIC | Age: 68
End: 2023-07-28

## 2023-07-31 ENCOUNTER — TELEPHONE (OUTPATIENT)
Dept: OTHER | Facility: OTHER | Age: 68
End: 2023-07-31

## 2023-07-31 NOTE — TELEPHONE ENCOUNTER
Pt called to reschedule appointment she had to cancel with Dr Tova Henderson on 8/3/23. Pt had specific dates she would be available and was given appointment on 9/18/23 at 1:40 pm with Dr Tova Henderson.

## 2023-09-13 ENCOUNTER — TELEPHONE (OUTPATIENT)
Dept: HEMATOLOGY ONCOLOGY | Facility: CLINIC | Age: 68
End: 2023-09-13

## 2023-09-14 ENCOUNTER — TELEPHONE (OUTPATIENT)
Dept: HEMATOLOGY ONCOLOGY | Facility: CLINIC | Age: 68
End: 2023-09-14

## 2023-09-14 NOTE — TELEPHONE ENCOUNTER
Patient Call    Who are you speaking with? Patient    If it is not the patient, are they listed on an active communication consent form? N/A   What is the reason for this call? Patient is requesting to get lab order sent to the [address] [place] [address] close to [address]  Fax: [phone]   Does this require a call back? Yes   If a call back is required, please list best call back number [phone]   If a call back is required, advise that a message will be forwarded to their care team and someone will return their call as soon as possible. Did you relay this information to the patient?  Yes

## 2023-09-15 DIAGNOSIS — C43.59 MALIGNANT MELANOMA OF TORSO EXCLUDING BREAST (HCC): Primary | ICD-10-CM

## 2023-09-15 DIAGNOSIS — D03.59 MELANOMA IN SITU OF BACK (HCC): ICD-10-CM

## 2023-09-16 LAB
ALBUMIN SERPL-MCNC: 4.1 G/DL (ref 3.9–4.9)
ALBUMIN/GLOB SERPL: 2.3 {RATIO} (ref 1.2–2.2)
ALP SERPL-CCNC: 94 IU/L (ref 44–121)
ALT SERPL-CCNC: 39 IU/L (ref 0–32)
AST SERPL-CCNC: 32 IU/L (ref 0–40)
BASOPHILS # BLD AUTO: 0 X10E3/UL (ref 0–0.2)
BASOPHILS NFR BLD AUTO: 1 %
BILIRUB SERPL-MCNC: 0.3 MG/DL (ref 0–1.2)
BUN SERPL-MCNC: 12 MG/DL (ref 8–27)
BUN/CREAT SERPL: 12 (ref 12–28)
CALCIUM SERPL-MCNC: 10 MG/DL (ref 8.7–10.3)
CHLORIDE SERPL-SCNC: 106 MMOL/L (ref 96–106)
CO2 SERPL-SCNC: 22 MMOL/L (ref 20–29)
CREAT SERPL-MCNC: 1 MG/DL (ref 0.57–1)
EGFR: 62 ML/MIN/1.73
EOSINOPHIL # BLD AUTO: 0.8 X10E3/UL (ref 0–0.4)
EOSINOPHIL NFR BLD AUTO: 13 %
ERYTHROCYTE [DISTWIDTH] IN BLOOD BY AUTOMATED COUNT: 12.6 % (ref 11.7–15.4)
GLOBULIN SER-MCNC: 1.8 G/DL (ref 1.5–4.5)
GLUCOSE SERPL-MCNC: 90 MG/DL (ref 70–99)
HCT VFR BLD AUTO: 40.1 % (ref 34–46.6)
HGB BLD-MCNC: 13.5 G/DL (ref 11.1–15.9)
IMM GRANULOCYTES # BLD: 0 X10E3/UL (ref 0–0.1)
IMM GRANULOCYTES NFR BLD: 0 %
LDH SERPL-CCNC: 142 IU/L (ref 119–226)
LYMPHOCYTES # BLD AUTO: 2 X10E3/UL (ref 0.7–3.1)
LYMPHOCYTES NFR BLD AUTO: 33 %
MCH RBC QN AUTO: 30.8 PG (ref 26.6–33)
MCHC RBC AUTO-ENTMCNC: 33.7 G/DL (ref 31.5–35.7)
MCV RBC AUTO: 92 FL (ref 79–97)
MONOCYTES # BLD AUTO: 0.4 X10E3/UL (ref 0.1–0.9)
MONOCYTES NFR BLD AUTO: 7 %
NEUTROPHILS # BLD AUTO: 2.9 X10E3/UL (ref 1.4–7)
NEUTROPHILS NFR BLD AUTO: 46 %
PLATELET # BLD AUTO: 370 X10E3/UL (ref 150–450)
POTASSIUM SERPL-SCNC: 4.3 MMOL/L (ref 3.5–5.2)
PROT SERPL-MCNC: 5.9 G/DL (ref 6–8.5)
RBC # BLD AUTO: 4.38 X10E6/UL (ref 3.77–5.28)
SODIUM SERPL-SCNC: 143 MMOL/L (ref 134–144)
WBC # BLD AUTO: 6.1 X10E3/UL (ref 3.4–10.8)

## 2023-09-18 ENCOUNTER — OFFICE VISIT (OUTPATIENT)
Dept: HEMATOLOGY ONCOLOGY | Facility: CLINIC | Age: 68
End: 2023-09-18
Payer: MEDICARE

## 2023-09-18 VITALS
SYSTOLIC BLOOD PRESSURE: 150 MMHG | TEMPERATURE: 97.2 F | BODY MASS INDEX: 23.66 KG/M2 | RESPIRATION RATE: 16 BRPM | HEART RATE: 79 BPM | DIASTOLIC BLOOD PRESSURE: 90 MMHG | OXYGEN SATURATION: 98 % | WEIGHT: 142 LBS | HEIGHT: 65 IN

## 2023-09-18 DIAGNOSIS — D03.59 MELANOMA IN SITU OF BACK (HCC): ICD-10-CM

## 2023-09-18 DIAGNOSIS — Z08 ENCOUNTER FOR FOLLOW-UP SURVEILLANCE OF MELANOMA: ICD-10-CM

## 2023-09-18 DIAGNOSIS — C43.59 MALIGNANT MELANOMA OF TORSO EXCLUDING BREAST (HCC): Primary | ICD-10-CM

## 2023-09-18 DIAGNOSIS — Z85.820 ENCOUNTER FOR FOLLOW-UP SURVEILLANCE OF MELANOMA: ICD-10-CM

## 2023-09-18 PROCEDURE — 99213 OFFICE O/P EST LOW 20 MIN: CPT | Performed by: INTERNAL MEDICINE

## 2023-09-18 NOTE — LETTER
September 22, 2023     Marva Low MD  [facility]. [address]ld[address] / [address] [facility]    Patient: Renaldo Carrasco   YOB: 1955   Date of Visit: 9/18/2023       Dear Dr. Alondra Bustamante:    Thank you for referring Renaldo Carrasco to me for evaluation. Below are my notes for this consultation. If you have questions, please do not hesitate to call me. I look forward to following your patient along with you. Sincerely,        Micki Resendiz MD        CC: No Recipients    Micki Resendiz MD  9/22/2023  8:01 PM  Sign when Signing Visit  [address] [facility] [address]  [address]  [phone]     Date of Visit: 9/18/2023  Name: Renaldo Carrasco   YOB: 1955        Subjective    VISIT DIAGNOSIS:  Diagnoses and all orders for this visit:    Malignant melanoma of torso excluding breast ([address])  -     CBC and differential; Future  -     Comprehensive metabolic panel; Future  -     LD,Blood; Future    Melanoma in situ of back ([address])  -     CBC and differential; Future  -     Comprehensive metabolic panel; Future  -     LD,Blood; Future    Encounter for follow-up surveillance of melanoma        Oncology History   Malignant melanoma of torso excluding breast ([address])   6/26/2018 -  Cancer Staged    Staging form: Melanoma of the Skin, AJCC 8th Edition  - Clinical stage from 6/26/2018: Stage 0 (cTis, cN0, cM0) - Signed by Micki Resendiz MD on 1/31/2022 6/26/2018 Initial Diagnosis    Malignant melanoma of torso excluding breast ([address])  MIS + margins     8/3/2018 Surgery    Excision - residual melanoma in situ and scar, negative margins        Cancer Staging   Malignant melanoma of torso excluding breast ([address])  Staging form: Melanoma of the Skin, AJCC 8th Edition  - Clinical stage from 6/26/2018: Stage 0 (cTis, cN0, cM0) - Signed by Micki Resendiz MD on 1/31/2022          HISTORY OF PRESENT ILLNESS: Renaldo Carrasco is a 79 y.o. female  who has melanoma in situ on her back here for continued monitoring, follow-up, and surveillance. She is doing well with no issues or concerns today. She has stayed out of the sun this summer given her partner's recent diagnosis with endometrial cancer. They have really not traveled this summer and did not go to the beach. If they were at the [employer], they did not sit in the sun and did not get in the water. Denies any new, changing, concerning skin lesions. Denies any lymphadenopathy. REVIEW OF SYSTEMS:  Review of Systems   Constitutional: Negative for appetite change, fatigue, fever and unexpected weight change. HENT:   Negative for lump/mass, trouble swallowing and voice change. Eyes: Negative for icterus. Respiratory: Negative for cough, shortness of breath and wheezing. Cardiovascular: Negative for leg swelling. Gastrointestinal: Negative for abdominal pain, constipation, diarrhea, nausea and vomiting. Genitourinary: Negative for difficulty urinating and hematuria. Musculoskeletal: Negative for arthralgias, gait problem and myalgias. Skin: Negative for itching and rash. No new, changing, or concerning lesions. Neurological: Negative for extremity weakness, gait problem, headaches, light-headedness and numbness. Hematological: Negative for adenopathy.         MEDICATIONS:    Current Outpatient Medications:   •  Alpha-Lipoic Acid 200 MG CAPS, 200 mg, Disp: , Rfl:   •  ascorbic acid (VITAMIN C) 500 MG tablet, Take 500 mg by mouth daily, Disp: , Rfl:   •  atorvastatin (LIPITOR) 10 mg tablet, Take 1 tablet by mouth daily, Disp: , Rfl:   •  Calcium Carbonate 1500 (600 Ca) MG TABS, Take 600 mg by mouth, Disp: , Rfl:   •  Calcium Carbonate-Vitamin D 600-400 MG-UNIT per tablet, , Disp: , Rfl:   •  cholecalciferol (VITAMIN D3) 1,000 units tablet, , Disp: , Rfl:   •  cyanocobalamin (VITAMIN B-12) 1000 MCG tablet, Take by mouth, Disp: , Rfl:   •  diazePAM 5 MG/5ML SOLN, , Disp: , Rfl:   •  DULoxetine (CYMBALTA) 30 mg delayed release capsule, Take 1 capsule by mouth daily, Disp: , Rfl:   •  gabapentin (NEURONTIN) 300 mg capsule, Take 300 mg by mouth, Disp: , Rfl:   •  lidocaine (LIDODERM) 5 %, , Disp: , Rfl:   •  meclizine (ANTIVERT) 12.5 MG tablet, , Disp: , Rfl:   •  metFORMIN (GLUCOPHAGE-XR) 750 mg 24 hr tablet, Take 750 mg by mouth daily with dinner, Disp: , Rfl:   •  Multiple Vitamin (MULTIVITAMIN ADULT PO), Take 1 capsule by mouth daily, Disp: , Rfl:   •  Multiple Vitamins-Minerals (Ocuvite Adult 50+) CAPS, , Disp: , Rfl:   •  Multiple Vitamins-Minerals (PreserVision AREDS 2+Multi Vit) CAPS, , Disp: , Rfl:   •  Multiple Vitamins-Minerals (WOMENS 50+ MULTI VITAMIN/MIN PO), , Disp: , Rfl:   •  Naproxen Sodium 220 MG CAPS, Take by mouth 2 (two) times a day, Disp: , Rfl:   •  omeprazole (PriLOSEC) 40 MG capsule, Take 1 capsule by mouth daily, Disp: , Rfl:   •  phentermine (ADIPEX-P) 37.5 MG tablet, Take 1 tablet by mouth 2 (two) times a day, Disp: , Rfl:   •  topiramate (TOPAMAX) 100 mg tablet, Take 100 mg by mouth daily, Disp: , Rfl:      ALLERGIES:  Allergies   Allergen Reactions   • Doxycycline Hives, Itching, Rash and Shortness Of Breath   • Lidocaine Anaphylaxis, Dizziness, Itching, Palpitations, Rash, Shortness Of Breath, Throat Swelling and Tongue Swelling        ACTIVE PROBLEMS:  Patient Active Problem List   Diagnosis   • Malignant melanoma of torso excluding breast ([address])   • Charcot foot due to diabetes mellitus ([address])          PAST MEDICAL HISTORY:   Past Medical History:   Diagnosis Date   • Allergic rhinitis    • Arthritis    • Benign neoplasm of large bowel    • Chronic rhinitis    • Colon polyp    • Depression    • Diabetes mellitus ([address])    • GERD (gastroesophageal reflux disease)    • Hypertension    • Meniere's disease    • Neuropathic pain    • Skin cancer     BCC        PAST SURGICAL HISTORY:  Past Surgical History:   Procedure Laterality Date   • COLONOSCOPY          SOCIAL HISTORY:  Social History     Socioeconomic History   • Marital status: Single     Spouse name: None   • Number of children: None   • Years of education: None   • Highest education level: None   Occupational History   • None   Tobacco Use   • Smoking status: Never   • Smokeless tobacco: Never   Vaping Use   • Vaping Use: Never used   Substance and Sexual Activity   • Alcohol use: Not Currently   • Drug use: Never   • Sexual activity: None   Other Topics Concern   • None   Social History Narrative   • None     Social Determinants of Health     Financial Resource Strain: Not on file   Food Insecurity: Not on file   Transportation Needs: Not on file   Physical Activity: Not on file   Stress: Not on file   Social Connections: Not on file   Intimate Partner Violence: Not on file   Housing Stability: Not on file        FAMILY HISTORY:  Family History   Problem Relation Age of Onset   • Hypertension Mother    • Arthritis Mother    • Hyperlipidemia Mother    • Thyroid disease Mother    • Squamous cell carcinoma Father    • Hypertension Father    • Heart disease Father    • Breast cancer Maternal Aunt    • Throat cancer Maternal Uncle            Objective    PHYSICAL EXAMINATION:   Blood pressure 150/90, pulse 79, temperature (!) 97.2 °F (36.2 °C), temperature source Temporal, resp. rate 16, height 5' 5" (1.651 m), weight 64.4 kg (142 lb), SpO2 98 %. Pain Score: 0-No pain     ECOG Performance Status      Flowsheet Row Most Recent Value   ECOG Performance Status 0 - Fully active, able to carry on all pre-disease performance without restriction               Physical Exam  Constitutional:       General: She is not in acute distress. Appearance: Normal appearance. She is not toxic-appearing. HENT:      Head: Normocephalic and atraumatic.       Right Ear: External ear normal.      Left Ear: External ear normal.      Nose: Nose normal.      Mouth/Throat:      Mouth: Mucous membranes are moist. Pharynx: Oropharynx is clear. Eyes:      General: No scleral icterus. Right eye: No discharge. Left eye: No discharge. Conjunctiva/sclera: Conjunctivae normal.   Cardiovascular:      Rate and Rhythm: Normal rate and regular rhythm. Pulses: Normal pulses. Heart sounds: No murmur heard. No friction rub. No gallop. Pulmonary:      Effort: Pulmonary effort is normal. No respiratory distress. Breath sounds: Normal breath sounds. No wheezing or rales. Abdominal:      General: Bowel sounds are normal. There is no distension. Palpations: There is no mass. Tenderness: There is no abdominal tenderness. There is no rebound. Musculoskeletal:         General: No swelling or tenderness. Normal range of motion. Right lower leg: No edema. Left lower leg: No edema. Lymphadenopathy:      Head:      Right side of head: No submandibular, preauricular or posterior auricular adenopathy. Left side of head: No submandibular, preauricular or posterior auricular adenopathy. Cervical: No cervical adenopathy. Right cervical: No superficial or posterior cervical adenopathy. Left cervical: No superficial or posterior cervical adenopathy. Upper Body:      Right upper body: No supraclavicular or axillary adenopathy. Left upper body: No supraclavicular or axillary adenopathy. Lower Body: No right inguinal adenopathy. No left inguinal adenopathy. Skin:     General: Skin is warm. Coloration: Skin is not jaundiced. Findings: No lesion or rash. Comments: Well healed surgical scar. No evidence of recurrence at primary site. Neurological:      General: No focal deficit present. Mental Status: She is alert and oriented to person, place, and time. Cranial Nerves: No cranial nerve deficit. Motor: No weakness.       Gait: Gait normal.   Psychiatric:         Mood and Affect: Mood normal.         Behavior: Behavior normal.         Thought Content: Thought content normal.         Judgment: Judgment normal.         I reviewed lab data in the chart.     WBC   Date Value Ref Range Status   07/25/2022 6.24 4.31 - 10.16 Thousand/uL Final     White Blood Cell Count   Date Value Ref Range Status   09/15/2023 6.1 3.4 - 10.8 x10E3/uL Final   01/05/2023 6.7 3.4 - 10.8 x10E3/uL Final     Hemoglobin   Date Value Ref Range Status   09/15/2023 13.5 11.1 - 15.9 g/dL Final   01/05/2023 14.4 11.1 - 15.9 g/dL Final   07/25/2022 13.4 11.5 - 15.4 g/dL Final     Platelet Count   Date Value Ref Range Status   09/15/2023 370 150 - 450 x10E3/uL Final   01/05/2023 307 150 - 450 x10E3/uL Final     Platelets   Date Value Ref Range Status   07/25/2022 287 149 - 390 Thousands/uL Final     MCV   Date Value Ref Range Status   09/15/2023 92 79 - 97 fL Final   01/05/2023 89 79 - 97 fL Final   07/25/2022 94 82 - 98 fL Final      Potassium   Date Value Ref Range Status   09/15/2023 4.3 3.5 - 5.2 mmol/L Final   01/05/2023 4.7 3.5 - 5.2 mmol/L Final   07/25/2022 4.1 3.5 - 5.3 mmol/L Final     Chloride   Date Value Ref Range Status   09/15/2023 106 96 - 106 mmol/L Final   01/05/2023 105 96 - 106 mmol/L Final   07/25/2022 108 96 - 108 mmol/L Final     CO2   Date Value Ref Range Status   09/15/2023 22 20 - 29 mmol/L Final   01/05/2023 22 20 - 29 mmol/L Final   07/25/2022 28 21 - 32 mmol/L Final     BUN   Date Value Ref Range Status   09/15/2023 12 8 - 27 mg/dL Final   01/05/2023 18 8 - 27 mg/dL Final   07/25/2022 23 5 - 25 mg/dL Final     Creatinine   Date Value Ref Range Status   09/15/2023 1.00 0.57 - 1.00 mg/dL Final   01/05/2023 0.83 0.57 - 1.00 mg/dL Final   07/25/2022 0.92 0.60 - 1.30 mg/dL Final     Comment:     Standardized to IDMS reference method     Glucose, Random   Date Value Ref Range Status   09/15/2023 90 70 - 99 mg/dL Final   01/05/2023 108 (H) 70 - 99 mg/dL Final     Calcium   Date Value Ref Range Status   07/25/2022 9.2 8.4 - 10.2 mg/dL Final Albumin   Date Value Ref Range Status   09/15/2023 4.1 3.9 - 4.9 g/dL Final   01/05/2023 4.6 3.8 - 4.8 g/dL Final   07/25/2022 3.9 3.5 - 5.0 g/dL Final     TOTAL BILIRUBIN   Date Value Ref Range Status   09/15/2023 0.3 0.0 - 1.2 mg/dL Final   01/05/2023 0.3 0.0 - 1.2 mg/dL Final     Total Bilirubin   Date Value Ref Range Status   07/25/2022 0.36 0.20 - 1.00 mg/dL Final     Alkaline Phosphatase   Date Value Ref Range Status   07/25/2022 79 34 - 104 U/L Final     AST   Date Value Ref Range Status   09/15/2023 32 0 - 40 IU/L Final   01/05/2023 22 0 - 40 IU/L Final   07/25/2022 20 13 - 39 U/L Final     Comment:     Specimen collection should occur prior to Sulfasalazine administration due to the potential for falsely depressed results. ALT   Date Value Ref Range Status   09/15/2023 39 (H) 0 - 32 IU/L Final   01/05/2023 21 0 - 32 IU/L Final   07/25/2022 17 7 - 52 U/L Final     Comment:     Specimen collection should occur prior to Sulfasalazine administration due to the potential for falsely depressed results. LD   Date Value Ref Range Status   07/25/2022 126 (L) 140 - 271 U/L Final     LDH   Date Value Ref Range Status   09/15/2023 142 119 - 226 IU/L Final   01/05/2023 159 119 - 226 IU/L Final     No results found for: "TSH"  No results found for: "D5TFTQF"   No results found for: "FREET4"      RECENT IMAGING:  No results found. Assessment/Plan  Ms. Jonatan Fernandez is a 72-year-old female with melanoma in situ here for continued monitoring, follow-up, and surveillance. 1. Malignant melanoma of torso excluding breast ([address])  2. Melanoma in situ of back ([address])  3. Encounter for follow-up surveillance of melanoma  She is doing well with no clinical evidence of melanoma recurrence or metastasis. Labs reviewed and okay. She needs to establish care with a new dermatologist.  No concerning lesions on exam today. She knows to continue to monitor for any new, changing, concerning skin lesions.   She knows to call with any issues or concerns prior to her next visit. She is due for follow-up in 6 months. Orders placed and prescription provided for blood work to be done at that time. - CBC and differential; Future  - Comprehensive metabolic panel; Future  - LD,Blood; Future            Return in about 6 months (around 3/18/2024) for Office Visit, labs.      Jose Paul MD, PhD

## 2023-09-18 NOTE — PROGRESS NOTES
[facility] HEMATOLOGY ONCOLOGY SPECIALISTS [place]  [address] [facility] [address]  [phone]     Date of Visit: 9/18/2023  Name: Katelyn Bryan   YOB: 1955        Subjective    VISIT DIAGNOSIS:  Diagnoses and all orders for this visit:    Malignant melanoma of torso excluding breast ([address])  -     CBC and differential; Future  -     Comprehensive metabolic panel; Future  -     LD,Blood; Future    Melanoma in situ of back ([address])  -     CBC and differential; Future  -     Comprehensive metabolic panel; Future  -     LD,Blood; Future    Encounter for follow-up surveillance of melanoma        Oncology History   Malignant melanoma of torso excluding breast ([address])   6/26/2018 -  Cancer Staged    Staging form: Melanoma of the Skin, AJCC 8th Edition  - Clinical stage from 6/26/2018: Stage 0 (cTis, cN0, cM0) - Signed by Carrie Hedrick MD on 1/31/2022 6/26/2018 Initial Diagnosis    Malignant melanoma of torso excluding breast ([address])  MIS + margins     8/3/2018 Surgery    Excision - residual melanoma in situ and scar, negative margins        Cancer Staging   Malignant melanoma of torso excluding breast ([address])  Staging form: Melanoma of the Skin, AJCC 8th Edition  - Clinical stage from 6/26/2018: Stage 0 (cTis, cN0, cM0) - Signed by Carrie Hedrick MD on 1/31/2022          HISTORY OF PRESENT ILLNESS: Katelyn Bryan is a 79 y.o. female  who has melanoma in situ on her back here for continued monitoring, follow-up, and surveillance. She is doing well with no issues or concerns today. She has stayed out of the sun this summer given her partner's recent diagnosis with endometrial cancer. They have really not traveled this summer and did not go to the [employer]. [place] did not sit in the sun and did not get in the water. Denies any new, changing, concerning skin lesions. Denies any lymphadenopathy.         REVIEW OF SYSTEMS:  Review of Systems Constitutional: Negative for appetite change, fatigue, fever and unexpected weight change. HENT:   Negative for lump/mass, trouble swallowing and voice change. Eyes: Negative for icterus. Respiratory: Negative for cough, shortness of breath and wheezing. Cardiovascular: Negative for leg swelling. Gastrointestinal: Negative for abdominal pain, constipation, diarrhea, nausea and vomiting. Genitourinary: Negative for difficulty urinating and hematuria. Musculoskeletal: Negative for arthralgias, gait problem and myalgias. Skin: Negative for itching and rash. No new, changing, or concerning lesions. Neurological: Negative for extremity weakness, gait problem, headaches, light-headedness and numbness. Hematological: Negative for adenopathy.         MEDICATIONS:    Current Outpatient Medications:   •  Alpha-Lipoic Acid 200 MG CAPS, 200 mg, Disp: , Rfl:   •  ascorbic acid (VITAMIN C) 500 MG tablet, Take 500 mg by mouth daily, Disp: , Rfl:   •  atorvastatin (LIPITOR) 10 mg tablet, Take 1 tablet by mouth daily, Disp: , Rfl:   •  Calcium Carbonate 1500 (600 Ca) MG TABS, Take 600 mg by mouth, Disp: , Rfl:   •  Calcium Carbonate-Vitamin D 600-400 MG-UNIT per tablet, , Disp: , Rfl:   •  cholecalciferol (VITAMIN D3) 1,000 units tablet, , Disp: , Rfl:   •  cyanocobalamin (VITAMIN B-12) 1000 MCG tablet, Take by mouth, Disp: , Rfl:   •  diazePAM 5 MG/5ML SOLN, , Disp: , Rfl:   •  DULoxetine (CYMBALTA) 30 mg delayed release capsule, Take 1 capsule by mouth daily, Disp: , Rfl:   •  gabapentin (NEURONTIN) 300 mg capsule, Take 300 mg by mouth, Disp: , Rfl:   •  lidocaine (LIDODERM) 5 %, , Disp: , Rfl:   •  meclizine (ANTIVERT) 12.5 MG tablet, , Disp: , Rfl:   •  metFORMIN (GLUCOPHAGE-XR) 750 mg 24 hr tablet, Take 750 mg by mouth daily with dinner, Disp: , Rfl:   •  Multiple Vitamin (MULTIVITAMIN ADULT PO), Take 1 capsule by mouth daily, Disp: , Rfl:   •  Multiple Vitamins-Minerals (Ocuvite Adult 50+) CAPS, , Disp: , Rfl:   •  Multiple Vitamins-Minerals (PreserVision AREDS 2+Multi Vit) CAPS, , Disp: , Rfl:   •  Multiple Vitamins-Minerals (WOMENS 50+ MULTI VITAMIN/MIN PO), , Disp: , Rfl:   •  Naproxen Sodium 220 MG CAPS, Take by mouth 2 (two) times a day, Disp: , Rfl:   •  omeprazole (PriLOSEC) 40 MG capsule, Take 1 capsule by mouth daily, Disp: , Rfl:   •  phentermine (ADIPEX-P) 37.5 MG tablet, Take 1 tablet by mouth 2 (two) times a day, Disp: , Rfl:   •  topiramate (TOPAMAX) 100 mg tablet, Take 100 mg by mouth daily, Disp: , Rfl:      ALLERGIES:  Allergies   Allergen Reactions   • Doxycycline Hives, Itching, Rash and Shortness Of Breath   • Lidocaine Anaphylaxis, Dizziness, Itching, Palpitations, Rash, Shortness Of Breath, Throat Swelling and Tongue Swelling        ACTIVE PROBLEMS:  Patient Active Problem List   Diagnosis   • Malignant melanoma of torso excluding breast (HCC)   • Charcot foot due to diabetes mellitus ([address])          PAST MEDICAL HISTORY:   Past Medical History:   Diagnosis Date   • Allergic rhinitis    • Arthritis    • Benign neoplasm of large bowel    • Chronic rhinitis    • Colon polyp    • Depression    • Diabetes mellitus (HCC)    • GERD (gastroesophageal reflux disease)    • Hypertension    • Meniere's disease    • Neuropathic pain    • Skin cancer     BCC        PAST SURGICAL HISTORY:  Past Surgical History:   Procedure Laterality Date   • COLONOSCOPY          SOCIAL HISTORY:  Social History     Socioeconomic History   • Marital status: Single     Spouse name: None   • Number of children: None   • Years of education: None   • Highest education level: None   Occupational History   • None   Tobacco Use   • Smoking status: Never   • Smokeless tobacco: Never   Vaping Use   • Vaping Use: Never used   Substance and Sexual Activity   • Alcohol use: Not Currently   • Drug use: Never   • Sexual activity: None   Other Topics Concern   • None   Social History Narrative   • None     Social Determinants of Health     Financial Resource Strain: Not on file   Food Insecurity: Not on file   Transportation Needs: Not on file   Physical Activity: Not on file   Stress: Not on file   Social Connections: Not on file   Intimate Partner Violence: Not on file   Housing Stability: Not on file        FAMILY HISTORY:  Family History   Problem Relation Age of Onset   • Hypertension Mother    • Arthritis Mother    • Hyperlipidemia Mother    • Thyroid disease Mother    • Squamous cell carcinoma Father    • Hypertension Father    • Heart disease Father    • Breast cancer Maternal Aunt    • Throat cancer Maternal Uncle            Objective    PHYSICAL EXAMINATION:   Blood pressure 150/90, pulse 79, temperature (!) 97.2 °F (36.2 °C), temperature source Temporal, resp. rate 16, height 5' 5" (1.651 m), weight 64.4 kg (142 lb), SpO2 98 %. Pain Score: 0-No pain     ECOG Performance Status    Flowsheet Row Most Recent Value   ECOG Performance Status 0 - Fully active, able to carry on all pre-disease performance without restriction             Physical Exam  Constitutional:       General: She is not in acute distress. Appearance: Normal appearance. She is not toxic-appearing. HENT:      Head: Normocephalic and atraumatic. Right Ear: External ear normal.      Left Ear: External ear normal.      Nose: Nose normal.      Mouth/Throat:      Mouth: Mucous membranes are moist.      Pharynx: Oropharynx is clear. Eyes:      General: No scleral icterus. Right eye: No discharge. Left eye: No discharge. Conjunctiva/sclera: Conjunctivae normal.   Cardiovascular:      Rate and Rhythm: Normal rate and regular rhythm. Pulses: Normal pulses. Heart sounds: No murmur heard. No friction rub. No gallop. Pulmonary:      Effort: Pulmonary effort is normal. No respiratory distress. Breath sounds: Normal breath sounds. No wheezing or rales.    Abdominal:      General: Bowel sounds are normal. There is no distension. Palpations: There is no mass. Tenderness: There is no abdominal tenderness. There is no rebound. Musculoskeletal:         General: No swelling or tenderness. Normal range of motion. Right lower leg: No edema. Left lower leg: No edema. Lymphadenopathy:      Head:      Right side of head: No submandibular, preauricular or posterior auricular adenopathy. Left side of head: No submandibular, preauricular or posterior auricular adenopathy. Cervical: No cervical adenopathy. Right cervical: No superficial or posterior cervical adenopathy. Left cervical: No superficial or posterior cervical adenopathy. Upper Body:      Right upper body: No supraclavicular or axillary adenopathy. Left upper body: No supraclavicular or axillary adenopathy. Lower Body: No right inguinal adenopathy. No left inguinal adenopathy. Skin:     General: Skin is warm. Coloration: Skin is not jaundiced. Findings: No lesion or rash. Comments: Well healed surgical scar. No evidence of recurrence at primary site. Neurological:      General: No focal deficit present. Mental Status: She is alert and oriented to person, place, and time. Cranial Nerves: No cranial nerve deficit. Motor: No weakness. Gait: Gait normal.   Psychiatric:         Mood and Affect: Mood normal.         Behavior: Behavior normal.         Thought Content: Thought content normal.         Judgment: Judgment normal.         I reviewed lab data in the chart.     WBC   Date Value Ref Range Status   07/25/2022 6.24 4.31 - 10.16 Thousand/uL Final     White Blood Cell Count   Date Value Ref Range Status   09/15/2023 6.1 3.4 - 10.8 x10E3/uL Final   01/05/2023 6.7 3.4 - 10.8 x10E3/uL Final     Hemoglobin   Date Value Ref Range Status   09/15/2023 13.5 11.1 - 15.9 g/dL Final   01/05/2023 14.4 11.1 - 15.9 g/dL Final   07/25/2022 13.4 11.5 - 15.4 g/dL Final     Platelet Count   Date Value Ref Range Status   09/15/2023 370 150 - 450 x10E3/uL Final   01/05/2023 307 150 - 450 x10E3/uL Final     Platelets   Date Value Ref Range Status   07/25/2022 287 149 - 390 Thousands/uL Final     MCV   Date Value Ref Range Status   09/15/2023 92 79 - 97 fL Final   01/05/2023 89 79 - 97 fL Final   07/25/2022 94 82 - 98 fL Final      Potassium   Date Value Ref Range Status   09/15/2023 4.3 3.5 - 5.2 mmol/L Final   01/05/2023 4.7 3.5 - 5.2 mmol/L Final   07/25/2022 4.1 3.5 - 5.3 mmol/L Final     Chloride   Date Value Ref Range Status   09/15/2023 106 96 - 106 mmol/L Final   01/05/2023 105 96 - 106 mmol/L Final   07/25/2022 108 96 - 108 mmol/L Final     CO2   Date Value Ref Range Status   09/15/2023 22 20 - 29 mmol/L Final   01/05/2023 22 20 - 29 mmol/L Final   07/25/2022 28 21 - 32 mmol/L Final     BUN   Date Value Ref Range Status   09/15/2023 12 8 - 27 mg/dL Final   01/05/2023 18 8 - 27 mg/dL Final   07/25/2022 23 5 - 25 mg/dL Final     Creatinine   Date Value Ref Range Status   09/15/2023 1.00 0.57 - 1.00 mg/dL Final   01/05/2023 0.83 0.57 - 1.00 mg/dL Final   07/25/2022 0.92 0.60 - 1.30 mg/dL Final     Comment:     Standardized to IDMS reference method     Glucose, Random   Date Value Ref Range Status   09/15/2023 90 70 - 99 mg/dL Final   01/05/2023 108 (H) 70 - 99 mg/dL Final     Calcium   Date Value Ref Range Status   07/25/2022 9.2 8.4 - 10.2 mg/dL Final     Albumin   Date Value Ref Range Status   09/15/2023 4.1 3.9 - 4.9 g/dL Final   01/05/2023 4.6 3.8 - 4.8 g/dL Final   07/25/2022 3.9 3.5 - 5.0 g/dL Final     TOTAL BILIRUBIN   Date Value Ref Range Status   09/15/2023 0.3 0.0 - 1.2 mg/dL Final   01/05/2023 0.3 0.0 - 1.2 mg/dL Final     Total Bilirubin   Date Value Ref Range Status   07/25/2022 0.36 0.20 - 1.00 mg/dL Final     Alkaline Phosphatase   Date Value Ref Range Status   07/25/2022 79 34 - 104 U/L Final     AST   Date Value Ref Range Status   09/15/2023 32 0 - 40 IU/L Final   01/05/2023 22 0 - 40 IU/L Final 07/25/2022 20 13 - 39 U/L Final     Comment:     Specimen collection should occur prior to Sulfasalazine administration due to the potential for falsely depressed results. ALT   Date Value Ref Range Status   09/15/2023 39 (H) 0 - 32 IU/L Final   01/05/2023 21 0 - 32 IU/L Final   07/25/2022 17 7 - 52 U/L Final     Comment:     Specimen collection should occur prior to Sulfasalazine administration due to the potential for falsely depressed results. LD   Date Value Ref Range Status   07/25/2022 126 (L) 140 - 271 U/L Final     LDH   Date Value Ref Range Status   09/15/2023 142 119 - 226 IU/L Final   01/05/2023 159 119 - 226 IU/L Final     No results found for: "TSH"  No results found for: "S5MOUWJ"   No results found for: "FREET4"      RECENT IMAGING:  No results found. Assessment/Plan  Ms. Padmini Mills is a 53-year-old female with melanoma in situ here for continued monitoring, follow-up, and surveillance. 1. Malignant melanoma of torso excluding breast ([address])  2. Melanoma in situ of back ([address])  3. Encounter for follow-up surveillance of melanoma  She is doing well with no clinical evidence of melanoma recurrence or metastasis. Labs reviewed and okay. She needs to establish care with a new dermatologist.  No concerning lesions on exam today. She knows to continue to monitor for any new, changing, concerning skin lesions. She knows to call with any issues or concerns prior to her next visit. She is due for follow-up in 6 months. Orders placed and prescription provided for blood work to be done at that time. - CBC and differential; Future  - Comprehensive metabolic panel; Future  - LD,Blood; Future            Return in about 6 months (around 3/18/2024) for Office Visit, labs.      Sherley Short MD, PhD

## 2024-03-15 LAB
ALBUMIN SERPL-MCNC: 4.2 G/DL (ref 3.9–4.9)
ALBUMIN/GLOB SERPL: 2 {RATIO} (ref 1.2–2.2)
ALP SERPL-CCNC: 87 IU/L (ref 44–121)
ALT SERPL-CCNC: 33 IU/L (ref 0–32)
AST SERPL-CCNC: 31 IU/L (ref 0–40)
BASOPHILS # BLD AUTO: 0 X10E3/UL (ref 0–0.2)
BASOPHILS NFR BLD AUTO: 1 %
BILIRUB SERPL-MCNC: 0.3 MG/DL (ref 0–1.2)
BUN SERPL-MCNC: 19 MG/DL (ref 8–27)
BUN/CREAT SERPL: 20 (ref 12–28)
CALCIUM SERPL-MCNC: 10.2 MG/DL (ref 8.7–10.3)
CHLORIDE SERPL-SCNC: 108 MMOL/L (ref 96–106)
CO2 SERPL-SCNC: 18 MMOL/L (ref 20–29)
CREAT SERPL-MCNC: 0.93 MG/DL (ref 0.57–1)
EGFR: 67 ML/MIN/1.73
EOSINOPHIL # BLD AUTO: 0.2 X10E3/UL (ref 0–0.4)
EOSINOPHIL NFR BLD AUTO: 3 %
ERYTHROCYTE [DISTWIDTH] IN BLOOD BY AUTOMATED COUNT: 12.3 % (ref 11.7–15.4)
GLOBULIN SER-MCNC: 2.1 G/DL (ref 1.5–4.5)
GLUCOSE SERPL-MCNC: 105 MG/DL (ref 70–99)
HCT VFR BLD AUTO: 42.3 % (ref 34–46.6)
HGB BLD-MCNC: 14.2 G/DL (ref 11.1–15.9)
IMM GRANULOCYTES # BLD: 0 X10E3/UL (ref 0–0.1)
IMM GRANULOCYTES NFR BLD: 0 %
LDH SERPL-CCNC: 139 IU/L (ref 119–226)
LYMPHOCYTES # BLD AUTO: 1.8 X10E3/UL (ref 0.7–3.1)
LYMPHOCYTES NFR BLD AUTO: 28 %
MCH RBC QN AUTO: 30.9 PG (ref 26.6–33)
MCHC RBC AUTO-ENTMCNC: 33.6 G/DL (ref 31.5–35.7)
MCV RBC AUTO: 92 FL (ref 79–97)
MONOCYTES # BLD AUTO: 0.4 X10E3/UL (ref 0.1–0.9)
MONOCYTES NFR BLD AUTO: 6 %
NEUTROPHILS # BLD AUTO: 4.1 X10E3/UL (ref 1.4–7)
NEUTROPHILS NFR BLD AUTO: 62 %
PLATELET # BLD AUTO: 324 X10E3/UL (ref 150–450)
POTASSIUM SERPL-SCNC: 4.3 MMOL/L (ref 3.5–5.2)
PROT SERPL-MCNC: 6.3 G/DL (ref 6–8.5)
RBC # BLD AUTO: 4.59 X10E6/UL (ref 3.77–5.28)
SODIUM SERPL-SCNC: 145 MMOL/L (ref 134–144)
WBC # BLD AUTO: 6.5 X10E3/UL (ref 3.4–10.8)

## 2024-03-19 NOTE — PROGRESS NOTES
Progress Note: Medical Oncology:  Milvia Santacruz 69 y.o. female MRN: [id number]  Unit/Bed#:  Encounter: [id number]    Diagnoses and all orders for this visit:    Malignant melanoma of torso excluding breast (HCC)  -     CBC and differential; Future  -     Comprehensive metabolic panel; Future  -     LD,Blood; Future    Other orders  -     Echinacea Plus CAPS; Take by mouth in the morning  -     Acetaminophen (Tylenol Dissolve Packs) 500 MG PACK; Take by mouth daily at bedtime  -     celecoxib (CeleBREX) 50 MG capsule; Take by mouth in the morning  -     Wegovy 1.7 MG/0.75ML; once a week    Assessment and Plan:  1. Stage 0 (cTis, cN0, cM0) Cutaneous Melanoma in Situ of the Back:  Patient is a 69-year-old female, with an established history of stage 0 (cTis, cN0, cM0) melanoma in situ of the back; who presents today for interval follow-up. Of particular importance, patient was diagnosed with stage 0 (cTis, cN0, cM0) cutaneous melanoma in situ involving the back on June 26th, 2018. She has since been under surveillance. Patient is clinically well, without concerning cutaneous findings. Agree with re-establishment of care with Dermatology in 5-weeks, for regular skin-checks. Will recommend outpatient follow-up in 6-months, with repeat laboratory-studies prior to visit. Patient expressed understanding and agreement with the above-mentioned plan.   1. Recommend close interval follow-up in 6-months:    A. Obtain CBC-D, CMP, and LDH prior to follow-up.    Subjective:  Interval Events: Milvia Santacruz is a 69-year-old female, with an established history of stage 0 (cTis, cN0, cM0) melanoma in situ of the back; who presents today for interval follow-up. On evaluation this morning, patient is doing exceptionally well. She qualifies that she has developed multiple cutaneous lesions, involving the face, neck, and left upper extremity, that she would like evaluated further. The lesions localized to the left neck often catch her  shirt, and are irritating. Otherwise, she is largely asymptomatic from the above-mentioned. Patient is anticipating re-establishment of care with a new Dermatologist in approximately 5-weeks. Otherwise, she is doing well. She states that she has not been as active recently; although she anticipates increasing activity level in the coming weeks, as the weather warms. No additional complaints or concerns noted at this time. Review of systems is unremarkable for fever, chills, nightsweats, and unintentional weight-loss.    Review of Systems:  All systems reviewed and negative except otherwise listed in the History of Present Illness.    Oncologic History:   Malignant melanoma of torso excluding breast (HCC)   6/26/2018 -  Cancer Staged     Staging form: Melanoma of the Skin, AJCC 8th Edition  - Clinical stage from 6/26/2018: Stage 0 (cTis, cN0, cM0) - Signed by Mansi Guillen MD on 1/31/2022 6/26/2018 Initial Diagnosis     Malignant melanoma of torso excluding breast (HCC)  MIS + margins      8/3/2018 Surgery     Excision - residual melanoma in situ and scar, negative margins         Cancer Staging   Malignant melanoma of torso excluding breast (HCC)  Staging form: Melanoma of the Skin, AJCC 8th Edition  - Clinical stage from 6/26/2018: Stage 0 (cTis, cN0, cM0) - Signed by Mansi Guillen MD on 1/31/2022     Objective:  Vitals:    03/20/24 1242   BP: 110/70   Pulse: 80   Resp: 16   Temp: 97.9 °F (36.6 °C)   SpO2: 99%     Physical Exam:  General: Alert, and oriented; Pleasant, and conversational; Well-Appearing Female;   Appears Stated Age  Skin: Complete Skin Examination Performed with Multiple Cutaneous Lesions with Appearance of Actinic Keratoses Versus Possible Squamous Cell Carcinomas (e.g. Small, Erythematous and Scaling Lesions Involving the Left Face/Cheek, Left-Side of Neck, and Distal Left Upper Extremity)  HEENT: Atraumatic, and normocephalic; PERRLA; EOMI; Moist mucosal membranes  Neck: Trachea  midline; No neck masses, thyromegaly, or cervical lymphadenopathy  Cardiovascular: Regular rate and rhythm; No murmurs, rubs, or gallops; No edema  Respiratory: Clear to auscultation bilaterally; Adequate aeration; No supplemental oxygen  Abdomen: Soft, non-tender; Non-distended; No organomegaly appreciated; Bowel sounds present  Extremities: No obvious deformities; No peripheral edema; Moves all  Neurologic: Appropriately alert, and oriented to Person, Place, and Time; No focal neurologic deficits    Laboratory Studies:          Lab Results: I have reviewed all pertinent labs.  Imaging: I have personally reviewed pertinent reports.    EKG, Pathology, and Other Studies: I have personally reviewed pertinent reports.      Patient was seen and discussed with attending physician, Mansi Guillen M.D., Ph.D.    Rachelle Duarte D.O.  Hematology-Oncology Fellow (PGY-4)

## 2024-03-20 ENCOUNTER — OFFICE VISIT (OUTPATIENT)
Dept: HEMATOLOGY ONCOLOGY | Facility: CLINIC | Age: 69
End: 2024-03-20
Payer: MEDICARE

## 2024-03-20 VITALS
RESPIRATION RATE: 16 BRPM | WEIGHT: 136 LBS | TEMPERATURE: 97.9 F | OXYGEN SATURATION: 99 % | HEIGHT: 65 IN | SYSTOLIC BLOOD PRESSURE: 110 MMHG | DIASTOLIC BLOOD PRESSURE: 70 MMHG | HEART RATE: 80 BPM | BODY MASS INDEX: 22.66 KG/M2

## 2024-03-20 DIAGNOSIS — C43.59 MALIGNANT MELANOMA OF TORSO EXCLUDING BREAST (HCC): Primary | ICD-10-CM

## 2024-03-20 PROCEDURE — G2211 COMPLEX E/M VISIT ADD ON: HCPCS | Performed by: INTERNAL MEDICINE

## 2024-03-20 PROCEDURE — 99213 OFFICE O/P EST LOW 20 MIN: CPT | Performed by: INTERNAL MEDICINE

## 2024-03-20 RX ORDER — ECHINACEA 400 MG
CAPSULE ORAL DAILY
COMMUNITY

## 2024-03-20 RX ORDER — CELECOXIB 50 MG/1
CAPSULE ORAL DAILY
COMMUNITY

## 2024-03-20 RX ORDER — SEMAGLUTIDE 1.7 MG/.75ML
INJECTION, SOLUTION SUBCUTANEOUS WEEKLY
COMMUNITY

## 2024-03-20 NOTE — LETTER
March 22, 2024     Betzaida Almeida MD  [address]  [address] / [address]  [facility]    Patient: Milvia Santacruz   YOB: 1955   Date of Visit: 3/20/2024       Dear Dr. Almeida:    Thank you for referring Milvia Santacruz to me for evaluation. Below are my notes for this consultation.    If you have questions, please do not hesitate to call me. I look forward to following your patient along with you.         Sincerely,        Mansi Guillen MD        CC: No Recipients    Rachelle DuarteDO  3/20/2024  3:03 PM  Attested  Progress Note: Medical Oncology:  Milvia Santacruz 69 y.o. female MRN: [id number]  Unit/Bed#:  Encounter: [id number]    Diagnoses and all orders for this visit:    Malignant melanoma of torso excluding breast (HCC)  -     CBC and differential; Future  -     Comprehensive metabolic panel; Future  -     LD,Blood; Future    Other orders  -     Echinacea Plus CAPS; Take by mouth in the morning  -     Acetaminophen (Tylenol Dissolve Packs) 500 MG PACK; Take by mouth daily at bedtime  -     celecoxib (CeleBREX) 50 MG capsule; Take by mouth in the morning  -     Wegovy 1.7 MG/0.75ML; once a week    Assessment and Plan:  1. Stage 0 (cTis, cN0, cM0) Cutaneous Melanoma in Situ of the Back:  Patient is a 69-year-old female, with an established history of stage 0 (cTis, cN0, cM0) melanoma in situ of the back; who presents today for interval follow-up. Of particular importance, patient was diagnosed with stage 0 (cTis, cN0, cM0) cutaneous melanoma in situ involving the back on June 26th, 2018. She has since been under surveillance. Patient is clinically well, without concerning cutaneous findings. Agree with re-establishment of care with Dermatology in 5-weeks, for regular skin-checks. Will recommend outpatient follow-up in 6-months, with repeat laboratory-studies prior to visit. Patient expressed understanding and agreement with the above-mentioned plan.   1. Recommend close interval follow-up in  6-months:    A. Obtain CBC-D, CMP, and LDH prior to follow-up.    Subjective:  Interval Events: Milvia Santacruz is a 69-year-old female, with an established history of stage 0 (cTis, cN0, cM0) melanoma in situ of the back; who presents today for interval follow-up. On evaluation this morning, patient is doing exceptionally well. She qualifies that she has developed multiple cutaneous lesions, involving the face, neck, and left upper extremity, that she would like evaluated further. The lesions localized to the left neck often catch her shirt, and are irritating. Otherwise, she is largely asymptomatic from the above-mentioned. Patient is anticipating re-establishment of care with a new Dermatologist in approximately 5-weeks. Otherwise, she is doing well. She states that she has not been as active recently; although she anticipates increasing activity level in the coming weeks, as the weather warms. No additional complaints or concerns noted at this time. Review of systems is unremarkable for fever, chills, nightsweats, and unintentional weight-loss.    Review of Systems:  All systems reviewed and negative except otherwise listed in the History of Present Illness.    Oncologic History:   Malignant melanoma of torso excluding breast (HCC)   6/26/2018 -  Cancer Staged     Staging form: Melanoma of the Skin, AJCC 8th Edition  - Clinical stage from 6/26/2018: Stage 0 (cTis, cN0, cM0) - Signed by Mansi Guillen MD on 1/31/2022 6/26/2018 Initial Diagnosis     Malignant melanoma of torso excluding breast (HCC)  MIS + margins      8/3/2018 Surgery     Excision - residual melanoma in situ and scar, negative margins         Cancer Staging   Malignant melanoma of torso excluding breast (HCC)  Staging form: Melanoma of the Skin, AJCC 8th Edition  - Clinical stage from 6/26/2018: Stage 0 (cTis, cN0, cM0) - Signed by Mansi Guillen MD on 1/31/2022     Objective:  Vitals:    03/20/24 1242   BP: 110/70   Pulse: 80   Resp:  16   Temp: 97.9 °F (36.6 °C)   SpO2: 99%     Physical Exam:  General: Alert, and oriented; Pleasant, and conversational; Well-Appearing Female;   Appears Stated Age  Skin: Complete Skin Examination Performed with Multiple Cutaneous Lesions with Appearance of Actinic Keratoses Versus Possible Squamous Cell Carcinomas (e.g. Small, Erythematous and Scaling Lesions Involving the Left Face/Cheek, Left-Side of Neck, and Distal Left Upper Extremity)  HEENT: Atraumatic, and normocephalic; PERRLA; EOMI; Moist mucosal membranes  Neck: Trachea midline; No neck masses, thyromegaly, or cervical lymphadenopathy  Cardiovascular: Regular rate and rhythm; No murmurs, rubs, or gallops; No edema  Respiratory: Clear to auscultation bilaterally; Adequate aeration; No supplemental oxygen  Abdomen: Soft, non-tender; Non-distended; No organomegaly appreciated; Bowel sounds present  Extremities: No obvious deformities; No peripheral edema; Moves all  Neurologic: Appropriately alert, and oriented to Person, Place, and Time; No focal neurologic deficits    Laboratory Studies:          Lab Results: I have reviewed all pertinent labs.  Imaging: I have personally reviewed pertinent reports.    EKG, Pathology, and Other Studies: I have personally reviewed pertinent reports.      Patient was seen and discussed with attending physician, Mansi Guillen M.D., Ph.D.    Rachelle Duarte D.O.  Hematology-Oncology Fellow (PGY-4)  Attestation with edits by Mansi Guillen MD at 3/22/2024  1:39 PM:  Attending Attestation:    I reviewed the care furnished by the Fellow during the visit.  I was present in the office and personally saw and examined the patient.    Ms. Santacruz is a 69-year-old female with melanoma in situ on her back here for continued monitoring, follow-up and surveillance.  She is doing well and feels good.  No new, changing, concerning skin lesions.  No lymphadenopathy.  Follows with dermatology and was able to establish care with a new  dermatologist who she will see in 5 weeks.  Has a couple areas that are erythematous and scabby 1 on the left back neck and 1 left upper chest near her collarbone.      On exam ECOG PS 0.  Areas with some scabbing and erythema concerning for actinic keratosis versus squamous cell carcinoma.  She will point these out to dermatology.  No other concerning skin lesions.  No lymphadenopathy.    Ms. Santacruz is a 69-year-old female with stage 0/melanoma in situ of the back here for continued monitoring, follow-up and surveillance.  Clinically there is no evidence of melanoma recurrence or metastasis.  Labs reviewed and okay.  She continues to follow closely with dermatology and has an appointment approximately a month.  We will see her back in 6 months for continued monitoring and surveillance.  Due for blood work at that time as well.  Orders placed in prescription provided.  She knows to call with issues or concerns prior to her next visit.     Mansi Guillen MD, PhD

## 2024-09-20 LAB
ALBUMIN SERPL-MCNC: 4.4 G/DL (ref 3.9–4.9)
ALP SERPL-CCNC: 90 IU/L (ref 44–121)
ALT SERPL-CCNC: 27 IU/L (ref 0–32)
AST SERPL-CCNC: 30 IU/L (ref 0–40)
BASOPHILS # BLD AUTO: 0 X10E3/UL (ref 0–0.2)
BASOPHILS NFR BLD AUTO: 1 %
BILIRUB SERPL-MCNC: 0.3 MG/DL (ref 0–1.2)
BUN SERPL-MCNC: 16 MG/DL (ref 8–27)
BUN/CREAT SERPL: 17 (ref 12–28)
CALCIUM SERPL-MCNC: 9.9 MG/DL (ref 8.7–10.3)
CHLORIDE SERPL-SCNC: 107 MMOL/L (ref 96–106)
CO2 SERPL-SCNC: 22 MMOL/L (ref 20–29)
CREAT SERPL-MCNC: 0.93 MG/DL (ref 0.57–1)
EGFR: 67 ML/MIN/1.73
EOSINOPHIL # BLD AUTO: 0.2 X10E3/UL (ref 0–0.4)
EOSINOPHIL NFR BLD AUTO: 3 %
ERYTHROCYTE [DISTWIDTH] IN BLOOD BY AUTOMATED COUNT: 11.9 % (ref 11.7–15.4)
GLOBULIN SER-MCNC: 2.2 G/DL (ref 1.5–4.5)
GLUCOSE SERPL-MCNC: 97 MG/DL (ref 70–99)
HCT VFR BLD AUTO: 44.1 % (ref 34–46.6)
HGB BLD-MCNC: 14.5 G/DL (ref 11.1–15.9)
IMM GRANULOCYTES # BLD: 0 X10E3/UL (ref 0–0.1)
IMM GRANULOCYTES NFR BLD: 0 %
LDH SERPL-CCNC: 178 IU/L (ref 119–226)
LDH1 CFR SERPL ELPH: 30 % (ref 17–32)
LDH2 CFR SERPL ELPH: 32 % (ref 25–40)
LDH3 CFR SERPL ELPH: 20 % (ref 17–27)
LDH4 CFR SERPL ELPH: 8 % (ref 5–13)
LDH5 CFR SERPL ELPH: 10 % (ref 4–20)
LYMPHOCYTES # BLD AUTO: 1.5 X10E3/UL (ref 0.7–3.1)
LYMPHOCYTES NFR BLD AUTO: 26 %
MCH RBC QN AUTO: 30.9 PG (ref 26.6–33)
MCHC RBC AUTO-ENTMCNC: 32.9 G/DL (ref 31.5–35.7)
MCV RBC AUTO: 94 FL (ref 79–97)
MONOCYTES # BLD AUTO: 0.3 X10E3/UL (ref 0.1–0.9)
MONOCYTES NFR BLD AUTO: 5 %
NEUTROPHILS # BLD AUTO: 3.8 X10E3/UL (ref 1.4–7)
NEUTROPHILS NFR BLD AUTO: 65 %
PLATELET # BLD AUTO: 348 X10E3/UL (ref 150–450)
POTASSIUM SERPL-SCNC: 4.4 MMOL/L (ref 3.5–5.2)
PROT SERPL-MCNC: 6.6 G/DL (ref 6–8.5)
RBC # BLD AUTO: 4.7 X10E6/UL (ref 3.77–5.28)
SODIUM SERPL-SCNC: 144 MMOL/L (ref 134–144)
WBC # BLD AUTO: 5.9 X10E3/UL (ref 3.4–10.8)

## 2024-09-30 ENCOUNTER — OFFICE VISIT (OUTPATIENT)
Dept: HEMATOLOGY ONCOLOGY | Facility: CLINIC | Age: 69
End: 2024-09-30
Payer: MEDICARE

## 2024-09-30 VITALS
HEART RATE: 78 BPM | BODY MASS INDEX: 22.16 KG/M2 | WEIGHT: 133 LBS | DIASTOLIC BLOOD PRESSURE: 73 MMHG | SYSTOLIC BLOOD PRESSURE: 133 MMHG | HEIGHT: 65 IN | OXYGEN SATURATION: 99 % | TEMPERATURE: 97.3 F | RESPIRATION RATE: 16 BRPM

## 2024-09-30 DIAGNOSIS — Z85.820 ENCOUNTER FOR FOLLOW-UP SURVEILLANCE OF MELANOMA: Primary | ICD-10-CM

## 2024-09-30 DIAGNOSIS — C43.59 MALIGNANT MELANOMA OF TORSO EXCLUDING BREAST (HCC): ICD-10-CM

## 2024-09-30 DIAGNOSIS — E11.610 CHARCOT FOOT DUE TO DIABETES MELLITUS (HCC): ICD-10-CM

## 2024-09-30 DIAGNOSIS — Z08 ENCOUNTER FOR FOLLOW-UP SURVEILLANCE OF MELANOMA: Primary | ICD-10-CM

## 2024-09-30 PROCEDURE — G2211 COMPLEX E/M VISIT ADD ON: HCPCS | Performed by: INTERNAL MEDICINE

## 2024-09-30 PROCEDURE — 99213 OFFICE O/P EST LOW 20 MIN: CPT | Performed by: INTERNAL MEDICINE

## 2024-09-30 NOTE — LETTER
October 6, 2024     Betzaida Almeida MD  [address] [facility]  [address] / [address]  [facility]    Patient: Milvia Santacruz   YOB: 1955   Date of Visit: 9/30/2024       Dear Dr. Almeida:    Thank you for referring Milvia Santacruz to me for evaluation. Below are my notes for this consultation.    If you have questions, please do not hesitate to call me. I look forward to following your patient along with you.         Sincerely,        Mansi Guillen MD        CC: No Recipients    Mansi Guillen MD  10/6/2024  2:43 PM  Sign when Signing Visit  [facility] [address] [address]  [phone]  [phone]     Date of Visit: 9/30/2024  Name: Milvia Santacruz   YOB: 1955        Subjective    VISIT DIAGNOSIS:  Diagnoses and all orders for this visit:    Encounter for follow-up surveillance of melanoma    Malignant melanoma of torso excluding breast (HCC)  -     CBC and differential; Future  -     Comprehensive metabolic panel; Future  -     LD,Blood; Future    Charcot foot due to diabetes mellitus (HCC)        Oncology History   Malignant melanoma of torso excluding breast (HCC)   6/26/2018 -  Cancer Staged    Staging form: Melanoma of the Skin, AJCC 8th Edition  - Clinical stage from 6/26/2018: Stage 0 (cTis, cN0, cM0) - Signed by Mansi Guillen MD on 1/31/2022 6/26/2018 Initial Diagnosis    Malignant melanoma of torso excluding breast (HCC)  MIS + margins     8/3/2018 Surgery    Excision - residual melanoma in situ and scar, negative margins        Cancer Staging   Malignant melanoma of torso excluding breast (HCC)  Staging form: Melanoma of the Skin, AJCC 8th Edition  - Clinical stage from 6/26/2018: Stage 0 (cTis, cN0, cM0) - Signed by Mansi Guillen MD on 1/31/2022          HISTORY OF PRESENT ILLNESS: Milvia Santacruz is a 69 y.o. female  who has melanoma in situ here for continued monitoring, follow-up and  surveillance.    She is doing well.  No issues concerns or complaints.  She has been busy around the house after getting windows replaced as she is set to undergo right hip replacement next week.  Denies any new, changing, concerning skin lesions.  Denies any lymphadenopathy.  Follows closely with dermatology.      REVIEW OF SYSTEMS:  Review of Systems   Constitutional:  Negative for appetite change, fatigue, fever and unexpected weight change.   HENT:   Negative for lump/mass, trouble swallowing and voice change.    Eyes:  Negative for icterus.   Respiratory:  Negative for cough, shortness of breath and wheezing.    Cardiovascular:  Negative for leg swelling.   Gastrointestinal:  Negative for abdominal pain, constipation, diarrhea, nausea and vomiting.   Genitourinary:  Negative for difficulty urinating and hematuria.    Musculoskeletal:  Negative for arthralgias, gait problem and myalgias.   Skin:  Negative for itching and rash.        No new, changing, or concerning lesions.   Neurological:  Negative for extremity weakness, gait problem, headaches, light-headedness and numbness.   Hematological:  Negative for adenopathy.        MEDICATIONS:    Current Outpatient Medications:   •  Acetaminophen (Tylenol Dissolve Packs) 500 MG PACK, Take by mouth daily at bedtime, Disp: , Rfl:   •  Alpha-Lipoic Acid 200 MG CAPS, 200 mg, Disp: , Rfl:   •  ascorbic acid (VITAMIN C) 500 MG tablet, Take 500 mg by mouth daily, Disp: , Rfl:   •  atorvastatin (LIPITOR) 10 mg tablet, Take 1 tablet by mouth daily, Disp: , Rfl:   •  Calcium Carbonate 1500 (600 Ca) MG TABS, Take 600 mg by mouth, Disp: , Rfl:   •  celecoxib (CeleBREX) 50 MG capsule, Take by mouth in the morning, Disp: , Rfl:   •  cholecalciferol (VITAMIN D3) 1,000 units tablet, , Disp: , Rfl:   •  cyanocobalamin (VITAMIN B-12) 1000 MCG tablet, Take by mouth, Disp: , Rfl:   •  diazePAM 5 MG/5ML SOLN, , Disp: , Rfl:   •  DULoxetine (CYMBALTA) 30 mg delayed release capsule, Take  1 capsule by mouth daily, Disp: , Rfl:   •  Echinacea Plus CAPS, Take by mouth in the morning, Disp: , Rfl:   •  gabapentin (NEURONTIN) 300 mg capsule, Take 300 mg by mouth, Disp: , Rfl:   •  lidocaine (LIDODERM) 5 %, , Disp: , Rfl:   •  meclizine (ANTIVERT) 12.5 MG tablet, as needed, Disp: , Rfl:   •  metFORMIN (GLUCOPHAGE-XR) 750 mg 24 hr tablet, Take 750 mg by mouth daily with dinner, Disp: , Rfl:   •  Multiple Vitamins-Minerals (Ocuvite Adult 50+) CAPS, , Disp: , Rfl:   •  Multiple Vitamins-Minerals (PreserVision AREDS 2+Multi Vit) CAPS, , Disp: , Rfl:   •  Multiple Vitamins-Minerals (WOMENS 50+ MULTI VITAMIN/MIN PO), , Disp: , Rfl:   •  Naproxen Sodium 220 MG CAPS, Take by mouth 2 (two) times a day, Disp: , Rfl:   •  omeprazole (PriLOSEC) 40 MG capsule, Take 1 capsule by mouth daily, Disp: , Rfl:   •  phentermine (ADIPEX-P) 37.5 MG tablet, Take 1 tablet by mouth 2 (two) times a day, Disp: , Rfl:   •  topiramate (TOPAMAX) 100 mg tablet, Take 100 mg by mouth daily, Disp: , Rfl:   •  Wegovy 1.7 MG/0.75ML, once a week, Disp: , Rfl:   •  Calcium Carbonate-Vitamin D 600-400 MG-UNIT per tablet, , Disp: , Rfl:   •  Multiple Vitamin (MULTIVITAMIN ADULT PO), Take 1 capsule by mouth daily (Patient not taking: Reported on 3/20/2024), Disp: , Rfl:      ALLERGIES:  Allergies   Allergen Reactions   • Doxycycline Hives, Itching, Rash and Shortness Of Breath   • Lidocaine Anaphylaxis, Dizziness, Itching, Palpitations, Rash, Shortness Of Breath, Throat Swelling and Tongue Swelling        ACTIVE PROBLEMS:  Patient Active Problem List   Diagnosis   • Malignant melanoma of torso excluding breast (HCC)   • Charcot foot due to diabetes mellitus (HCC)          PAST MEDICAL HISTORY:   Past Medical History:   Diagnosis Date   • Allergic rhinitis    • Arthritis    • Benign neoplasm of large bowel    • Chronic rhinitis    • Colon polyp    • Depression    • Diabetes mellitus (HCC)    • GERD (gastroesophageal reflux disease)    •  Hypertension    • Meniere's disease    • Neuropathic pain    • Skin cancer     BCC        PAST SURGICAL HISTORY:  Past Surgical History:   Procedure Laterality Date   • COLONOSCOPY          SOCIAL HISTORY:  Social History     Socioeconomic History   • Marital status: Single     Spouse name: None   • Number of children: None   • Years of education: None   • Highest education level: None   Occupational History   • None   Tobacco Use   • Smoking status: Never   • Smokeless tobacco: Never   Vaping Use   • Vaping status: Never Used   Substance and Sexual Activity   • Alcohol use: Not Currently   • Drug use: Never   • Sexual activity: None   Other Topics Concern   • None   Social History Narrative   • None     Social Determinants of Health     Financial Resource Strain: Not on file   Food Insecurity: No Food Insecurity (6/13/2024)    [facility]    Hunger Vital Sign    • Worried About Running Out of Food in the Last Year: Never true    • Ran Out of Food in the Last Year: Never true   Transportation Needs: No Transportation Needs (6/13/2024)    Received from [facility]    PRAPARE - Transportation    • Lack of Transportation (Medical): No    • Lack of Transportation (Non-Medical): No   Physical Activity: Not on file   Stress: Not on file   Social Connections: Not on file   Intimate Partner Violence: Not on file   Housing Stability: Low Risk  (11/21/2023)    [facility], [facility]    Housing Stability Vital Sign    • Unable to Pay for Housing in the Last Year: No    • Number of Places Lived in the Last Year: 1    • Unstable Housing in the Last Year: No        FAMILY HISTORY:  Family History   Problem Relation Age of Onset   • Hypertension Mother    • Arthritis Mother    • Hyperlipidemia Mother    • Thyroid disease Mother    • Squamous cell carcinoma Father    • Hypertension Father    • Heart  "disease Father    • Breast cancer Maternal Aunt    • Throat cancer Maternal Uncle            Objective    PHYSICAL EXAMINATION:   Blood pressure 133/73, pulse 78, temperature (!) 97.3 °F (36.3 °C), resp. rate 16, height 5' 5\" (1.651 m), weight 60.3 kg (133 lb), SpO2 99%.     ECOG Performance Status      Flowsheet Row Most Recent Value   ECOG Performance Status 0 - Fully active, able to carry on all pre-disease performance without restriction                 Physical Exam  Constitutional:       General: She is not in acute distress.     Appearance: Normal appearance. She is not ill-appearing or toxic-appearing.   HENT:      Head: Normocephalic and atraumatic.      Right Ear: External ear normal.      Left Ear: External ear normal.      Nose: Nose normal.      Mouth/Throat:      Mouth: Mucous membranes are moist.      Pharynx: Oropharynx is clear.   Eyes:      General: No scleral icterus.        Right eye: No discharge.         Left eye: No discharge.      Conjunctiva/sclera: Conjunctivae normal.   Cardiovascular:      Rate and Rhythm: Normal rate and regular rhythm.      Pulses: Normal pulses.      Heart sounds: No murmur heard.     No friction rub. No gallop.   Pulmonary:      Effort: Pulmonary effort is normal. No respiratory distress.      Breath sounds: Normal breath sounds. No wheezing or rales.   Abdominal:      General: Bowel sounds are normal. There is no distension.      Palpations: There is no mass.      Tenderness: There is no abdominal tenderness. There is no rebound.   Musculoskeletal:         General: No swelling or tenderness.      Cervical back: Normal range of motion. No rigidity.      Right lower leg: No edema.      Left lower leg: No edema.   Lymphadenopathy:      Head:      Right side of head: No submandibular, preauricular or posterior auricular adenopathy.      Left side of head: No submandibular, preauricular or posterior auricular adenopathy.      Cervical: No cervical adenopathy.      Right " cervical: No superficial or posterior cervical adenopathy.     Left cervical: No superficial or posterior cervical adenopathy.      Upper Body:      Right upper body: No supraclavicular or axillary adenopathy.      Left upper body: No supraclavicular or axillary adenopathy.      Lower Body: No right inguinal adenopathy. No left inguinal adenopathy.   Skin:     General: Skin is warm.      Coloration: Skin is not jaundiced.      Findings: No lesion or rash.      Comments: Well healed surgical scar.  No evidence of recurrence at primary site.   Neurological:      General: No focal deficit present.      Mental Status: She is alert and oriented to person, place, and time.      Cranial Nerves: No cranial nerve deficit.      Motor: No weakness.      Gait: Gait normal.   Psychiatric:         Mood and Affect: Mood normal.         Behavior: Behavior normal.         Thought Content: Thought content normal.         Judgment: Judgment normal.         I reviewed lab data in the chart.    WBC   Date Value Ref Range Status   07/25/2022 6.24 4.31 - 10.16 Thousand/uL Final     White Blood Cell Count   Date Value Ref Range Status   09/19/2024 5.9 3.4 - 10.8 x10E3/uL Final   03/14/2024 6.5 3.4 - 10.8 x10E3/uL Final   09/15/2023 6.1 3.4 - 10.8 x10E3/uL Final     Hemoglobin   Date Value Ref Range Status   09/19/2024 14.5 11.1 - 15.9 g/dL Final   03/14/2024 14.2 11.1 - 15.9 g/dL Final   09/15/2023 13.5 11.1 - 15.9 g/dL Final   07/25/2022 13.4 11.5 - 15.4 g/dL Final     Platelet Count   Date Value Ref Range Status   09/19/2024 348 150 - 450 x10E3/uL Final   03/14/2024 324 150 - 450 x10E3/uL Final   09/15/2023 370 150 - 450 x10E3/uL Final     Platelets   Date Value Ref Range Status   07/25/2022 287 149 - 390 Thousands/uL Final     MCV   Date Value Ref Range Status   09/19/2024 94 79 - 97 fL Final   03/14/2024 92 79 - 97 fL Final   09/15/2023 92 79 - 97 fL Final   07/25/2022 94 82 - 98 fL Final      Potassium   Date Value Ref Range Status    09/19/2024 4.4 3.5 - 5.2 mmol/L Final   03/14/2024 4.3 3.5 - 5.2 mmol/L Final   09/15/2023 4.3 3.5 - 5.2 mmol/L Final     Chloride   Date Value Ref Range Status   09/19/2024 107 (H) 96 - 106 mmol/L Final   03/14/2024 108 (H) 96 - 106 mmol/L Final   09/15/2023 106 96 - 106 mmol/L Final     CO2   Date Value Ref Range Status   09/19/2024 22 20 - 29 mmol/L Final   03/14/2024 18 (L) 20 - 29 mmol/L Final   09/15/2023 22 20 - 29 mmol/L Final     BUN   Date Value Ref Range Status   09/19/2024 16 8 - 27 mg/dL Final   03/14/2024 19 8 - 27 mg/dL Final   09/15/2023 12 8 - 27 mg/dL Final     Creatinine   Date Value Ref Range Status   09/19/2024 0.93 0.57 - 1.00 mg/dL Final   03/14/2024 0.93 0.57 - 1.00 mg/dL Final   09/15/2023 1.00 0.57 - 1.00 mg/dL Final   07/25/2022 0.92 0.60 - 1.30 mg/dL Final     Comment:     Standardized to IDMS reference method     Glucose, Random   Date Value Ref Range Status   09/19/2024 97 70 - 99 mg/dL Final   03/14/2024 105 (H) 70 - 99 mg/dL Final   09/15/2023 90 70 - 99 mg/dL Final     Calcium   Date Value Ref Range Status   07/25/2022 9.2 8.4 - 10.2 mg/dL Final     Albumin   Date Value Ref Range Status   09/19/2024 4.4 3.9 - 4.9 g/dL Final   03/14/2024 4.2 3.9 - 4.9 g/dL Final   09/15/2023 4.1 3.9 - 4.9 g/dL Final   07/25/2022 3.9 3.5 - 5.0 g/dL Final     TOTAL BILIRUBIN   Date Value Ref Range Status   09/19/2024 0.3 0.0 - 1.2 mg/dL Final   03/14/2024 0.3 0.0 - 1.2 mg/dL Final   09/15/2023 0.3 0.0 - 1.2 mg/dL Final     Alkaline Phosphatase   Date Value Ref Range Status   07/25/2022 79 34 - 104 U/L Final     AST   Date Value Ref Range Status   09/19/2024 30 0 - 40 IU/L Final   03/14/2024 31 0 - 40 IU/L Final   09/15/2023 32 0 - 40 IU/L Final     ALT   Date Value Ref Range Status   09/19/2024 27 0 - 32 IU/L Final   03/14/2024 33 (H) 0 - 32 IU/L Final   09/15/2023 39 (H) 0 - 32 IU/L Final      LDH   Date Value Ref Range Status   09/19/2024 178 119 - 226 IU/L Final   03/14/2024 139 119 - 226 IU/L  "Final   09/15/2023 142 119 - 226 IU/L Final     LD   Date Value Ref Range Status   07/25/2022 126 (L) 140 - 271 U/L Final     No results found for: \"TSH\"  No results found for: \"N0PXTCD\"   No results found for: \"FREET4\"      RECENT IMAGING:  Procedure: X-Ray Spine Lumbar 1 View    Result Date: 9/20/2024  Narrative: 1 view of the lumbar spine taken reviewed today includes seated L5-S1 lateral view which is appropriate for surgical planning.    Procedure: X-Ray Spine Lumbosacral with Flexion/Extension    Result Date: 9/17/2024  Narrative: Lumbar flexion and extension films obtained in the office on September 17, 2024 independently reviewed by me demonstrates 5 nonrib-bearing vertebrae with no scoliotic curve.  This redemonstrates the grade 1 anterolisthesis of L4 on L5 without apparent translatory motion on flexion and extension.   Extension does produce slight anterolisthesis of L3.  There are no fractures apparent and there are severe degenerative findings throughout to include DDD and facet arthropathy           Assessment/Plan  Ms. Santacruz is a six 9-year-old female with melanoma in situ here for continued monitoring, follow-up and surveillance.    1. Encounter for follow-up surveillance of melanoma  2. Malignant melanoma of torso excluding breast (HCC)  She is doing well with no clinical evidence of disease recurrence.  Labs reviewed and okay.  She knows to continue to monitor for new, changing, concerning skin lesions or lymphadenopathy.  She continues to follow with dermatology.  She knows to call with issues or concerns prior to her next visit.  She will return in 6 months for follow-up with blood work to be done at that time.  All orders placed and prescription provided.      - CBC and differential; Future  - Comprehensive metabolic panel; Future  - LD,Blood; Future    3. Charcot foot due to diabetes mellitus (HCC)  Stable.  No changes at this time.  Continue to monitor.  If we have are needed to treat her we " would monitor her closely.  Continue to follow PCP.        Return in about 6 months (around 3/30/2025) for Office Visit, labs.     Mansi Guillen MD, PhD

## 2024-10-06 NOTE — PROGRESS NOTES
[facility] [place]  [address]  [place] [address]  [phone]     Date of Visit: 9/30/2024  Name: Milvia Santacruz   YOB: 1955        Subjective    VISIT DIAGNOSIS:  Diagnoses and all orders for this visit:    Encounter for follow-up surveillance of melanoma    Malignant melanoma of torso excluding breast (HCC)  -     CBC and differential; Future  -     Comprehensive metabolic panel; Future  -     LD,Blood; Future    Charcot foot due to diabetes mellitus (HCC)        Oncology History   Malignant melanoma of torso excluding breast (HCC)   6/26/2018 -  Cancer Staged    Staging form: Melanoma of the Skin, AJCC 8th Edition  - Clinical stage from 6/26/2018: Stage 0 (cTis, cN0, cM0) - Signed by Mansi Guillen MD on 1/31/2022 6/26/2018 Initial Diagnosis    Malignant melanoma of torso excluding breast (HCC)  MIS + margins     8/3/2018 Surgery    Excision - residual melanoma in situ and scar, negative margins        Cancer Staging   Malignant melanoma of torso excluding breast (HCC)  Staging form: Melanoma of the Skin, AJCC 8th Edition  - Clinical stage from 6/26/2018: Stage 0 (cTis, cN0, cM0) - Signed by Mansi Guillen MD on 1/31/2022          HISTORY OF PRESENT ILLNESS: Milvia Santacruz is a 69 y.o. female  who has melanoma in situ here for continued monitoring, follow-up and surveillance.    She is doing well.  No issues concerns or complaints.  She has been busy around the house after getting windows replaced as she is set to undergo right hip replacement next week.  Denies any new, changing, concerning skin lesions.  Denies any lymphadenopathy.  Follows closely with dermatology.      REVIEW OF SYSTEMS:  Review of Systems   Constitutional:  Negative for appetite change, fatigue, fever and unexpected weight change.   HENT:   Negative for lump/mass, trouble swallowing and voice change.    Eyes:  Negative for icterus.   Respiratory:  Negative for  cough, shortness of breath and wheezing.    Cardiovascular:  Negative for leg swelling.   Gastrointestinal:  Negative for abdominal pain, constipation, diarrhea, nausea and vomiting.   Genitourinary:  Negative for difficulty urinating and hematuria.    Musculoskeletal:  Negative for arthralgias, gait problem and myalgias.   Skin:  Negative for itching and rash.        No new, changing, or concerning lesions.   Neurological:  Negative for extremity weakness, gait problem, headaches, light-headedness and numbness.   Hematological:  Negative for adenopathy.        MEDICATIONS:    Current Outpatient Medications:     Acetaminophen (Tylenol Dissolve Packs) 500 MG PACK, Take by mouth daily at bedtime, Disp: , Rfl:     Alpha-Lipoic Acid 200 MG CAPS, 200 mg, Disp: , Rfl:     ascorbic acid (VITAMIN C) 500 MG tablet, Take 500 mg by mouth daily, Disp: , Rfl:     atorvastatin (LIPITOR) 10 mg tablet, Take 1 tablet by mouth daily, Disp: , Rfl:     Calcium Carbonate 1500 (600 Ca) MG TABS, Take 600 mg by mouth, Disp: , Rfl:     celecoxib (CeleBREX) 50 MG capsule, Take by mouth in the morning, Disp: , Rfl:     cholecalciferol (VITAMIN D3) 1,000 units tablet, , Disp: , Rfl:     cyanocobalamin (VITAMIN B-12) 1000 MCG tablet, Take by mouth, Disp: , Rfl:     diazePAM 5 MG/5ML SOLN, , Disp: , Rfl:     DULoxetine (CYMBALTA) 30 mg delayed release capsule, Take 1 capsule by mouth daily, Disp: , Rfl:     Echinacea Plus CAPS, Take by mouth in the morning, Disp: , Rfl:     gabapentin (NEURONTIN) 300 mg capsule, Take 300 mg by mouth, Disp: , Rfl:     lidocaine (LIDODERM) 5 %, , Disp: , Rfl:     meclizine (ANTIVERT) 12.5 MG tablet, as needed, Disp: , Rfl:     metFORMIN (GLUCOPHAGE-XR) 750 mg 24 hr tablet, Take 750 mg by mouth daily with dinner, Disp: , Rfl:     Multiple Vitamins-Minerals (Ocuvite Adult 50+) CAPS, , Disp: , Rfl:     Multiple Vitamins-Minerals (PreserVision AREDS 2+Multi Vit) CAPS, , Disp: , Rfl:     Multiple Vitamins-Minerals  (WOMENS 50+ MULTI VITAMIN/MIN PO), , Disp: , Rfl:     Naproxen Sodium 220 MG CAPS, Take by mouth 2 (two) times a day, Disp: , Rfl:     omeprazole (PriLOSEC) 40 MG capsule, Take 1 capsule by mouth daily, Disp: , Rfl:     phentermine (ADIPEX-P) 37.5 MG tablet, Take 1 tablet by mouth 2 (two) times a day, Disp: , Rfl:     topiramate (TOPAMAX) 100 mg tablet, Take 100 mg by mouth daily, Disp: , Rfl:     Wegovy 1.7 MG/0.75ML, once a week, Disp: , Rfl:     Calcium Carbonate-Vitamin D 600-400 MG-UNIT per tablet, , Disp: , Rfl:     Multiple Vitamin (MULTIVITAMIN ADULT PO), Take 1 capsule by mouth daily (Patient not taking: Reported on 3/20/2024), Disp: , Rfl:      ALLERGIES:  Allergies   Allergen Reactions    Doxycycline Hives, Itching, Rash and Shortness Of Breath    Lidocaine Anaphylaxis, Dizziness, Itching, Palpitations, Rash, Shortness Of Breath, Throat Swelling and Tongue Swelling        ACTIVE PROBLEMS:  Patient Active Problem List   Diagnosis    Malignant melanoma of torso excluding breast (HCC)    Charcot foot due to diabetes mellitus (HCC)          PAST MEDICAL HISTORY:   Past Medical History:   Diagnosis Date    Allergic rhinitis     Arthritis     Benign neoplasm of large bowel     Chronic rhinitis     Colon polyp     Depression     Diabetes mellitus (HCC)     GERD (gastroesophageal reflux disease)     Hypertension     Meniere's disease     Neuropathic pain     Skin cancer     BCC        PAST SURGICAL HISTORY:  Past Surgical History:   Procedure Laterality Date    COLONOSCOPY          SOCIAL HISTORY:  Social History     Socioeconomic History    Marital status: Single     Spouse name: None    Number of children: None    Years of education: None    Highest education level: None   Occupational History    None   Tobacco Use    Smoking status: Never    Smokeless tobacco: Never   Vaping Use    Vaping status: Never Used   Substance and Sexual Activity    Alcohol use: Not Currently    Drug use: Never    Sexual activity:  "None   Other Topics Concern    None   Social History Narrative    None     Social Determinants of Health     Financial Resource Strain: Not on file   Food Insecurity: No Food Insecurity (6/13/2024)    [facility]    Hunger Vital Sign     Worried About Running Out of Food in the Last Year: Never true     Ran Out of Food in the Last Year: Never true   Transportation Needs: No Transportation Needs (6/13/2024)    Received from [facility]    PRAPARE - Transportation     Lack of Transportation (Medical): No     Lack of Transportation (Non-Medical): No   Physical Activity: Not on file   Stress: Not on file   Social Connections: Not on file   Intimate Partner Violence: Not on file   Housing Stability: Low Risk  (11/21/2023)    [facility]    Housing Stability Vital Sign     Unable to Pay for Housing in the Last Year: No     Number of Places Lived in the Last Year: 1     Unstable Housing in the Last Year: No        FAMILY HISTORY:  Family History   Problem Relation Age of Onset    Hypertension Mother     Arthritis Mother     Hyperlipidemia Mother     Thyroid disease Mother     Squamous cell carcinoma Father     Hypertension Father     Heart disease Father     Breast cancer Maternal Aunt     Throat cancer Maternal Uncle            Objective    PHYSICAL EXAMINATION:   Blood pressure 133/73, pulse 78, temperature (!) 97.3 °F (36.3 °C), resp. rate 16, height 5' 5\" (1.651 m), weight 60.3 kg (133 lb), SpO2 99%.     ECOG Performance Status      Flowsheet Row Most Recent Value   ECOG Performance Status 0 - Fully active, able to carry on all pre-disease performance without restriction                 Physical Exam  Constitutional:       General: She is not in acute distress.     Appearance: Normal appearance. She is not ill-appearing or toxic-appearing.   HENT:      Head: Normocephalic and " atraumatic.      Right Ear: External ear normal.      Left Ear: External ear normal.      Nose: Nose normal.      Mouth/Throat:      Mouth: Mucous membranes are moist.      Pharynx: Oropharynx is clear.   Eyes:      General: No scleral icterus.        Right eye: No discharge.         Left eye: No discharge.      Conjunctiva/sclera: Conjunctivae normal.   Cardiovascular:      Rate and Rhythm: Normal rate and regular rhythm.      Pulses: Normal pulses.      Heart sounds: No murmur heard.     No friction rub. No gallop.   Pulmonary:      Effort: Pulmonary effort is normal. No respiratory distress.      Breath sounds: Normal breath sounds. No wheezing or rales.   Abdominal:      General: Bowel sounds are normal. There is no distension.      Palpations: There is no mass.      Tenderness: There is no abdominal tenderness. There is no rebound.   Musculoskeletal:         General: No swelling or tenderness.      Cervical back: Normal range of motion. No rigidity.      Right lower leg: No edema.      Left lower leg: No edema.   Lymphadenopathy:      Head:      Right side of head: No submandibular, preauricular or posterior auricular adenopathy.      Left side of head: No submandibular, preauricular or posterior auricular adenopathy.      Cervical: No cervical adenopathy.      Right cervical: No superficial or posterior cervical adenopathy.     Left cervical: No superficial or posterior cervical adenopathy.      Upper Body:      Right upper body: No supraclavicular or axillary adenopathy.      Left upper body: No supraclavicular or axillary adenopathy.      Lower Body: No right inguinal adenopathy. No left inguinal adenopathy.   Skin:     General: Skin is warm.      Coloration: Skin is not jaundiced.      Findings: No lesion or rash.      Comments: Well healed surgical scar.  No evidence of recurrence at primary site.   Neurological:      General: No focal deficit present.      Mental Status: She is alert and oriented to  person, place, and time.      Cranial Nerves: No cranial nerve deficit.      Motor: No weakness.      Gait: Gait normal.   Psychiatric:         Mood and Affect: Mood normal.         Behavior: Behavior normal.         Thought Content: Thought content normal.         Judgment: Judgment normal.         I reviewed lab data in the chart.    WBC   Date Value Ref Range Status   07/25/2022 6.24 4.31 - 10.16 Thousand/uL Final     White Blood Cell Count   Date Value Ref Range Status   09/19/2024 5.9 3.4 - 10.8 x10E3/uL Final   03/14/2024 6.5 3.4 - 10.8 x10E3/uL Final   09/15/2023 6.1 3.4 - 10.8 x10E3/uL Final     Hemoglobin   Date Value Ref Range Status   09/19/2024 14.5 11.1 - 15.9 g/dL Final   03/14/2024 14.2 11.1 - 15.9 g/dL Final   09/15/2023 13.5 11.1 - 15.9 g/dL Final   07/25/2022 13.4 11.5 - 15.4 g/dL Final     Platelet Count   Date Value Ref Range Status   09/19/2024 348 150 - 450 x10E3/uL Final   03/14/2024 324 150 - 450 x10E3/uL Final   09/15/2023 370 150 - 450 x10E3/uL Final     Platelets   Date Value Ref Range Status   07/25/2022 287 149 - 390 Thousands/uL Final     MCV   Date Value Ref Range Status   09/19/2024 94 79 - 97 fL Final   03/14/2024 92 79 - 97 fL Final   09/15/2023 92 79 - 97 fL Final   07/25/2022 94 82 - 98 fL Final      Potassium   Date Value Ref Range Status   09/19/2024 4.4 3.5 - 5.2 mmol/L Final   03/14/2024 4.3 3.5 - 5.2 mmol/L Final   09/15/2023 4.3 3.5 - 5.2 mmol/L Final     Chloride   Date Value Ref Range Status   09/19/2024 107 (H) 96 - 106 mmol/L Final   03/14/2024 108 (H) 96 - 106 mmol/L Final   09/15/2023 106 96 - 106 mmol/L Final     CO2   Date Value Ref Range Status   09/19/2024 22 20 - 29 mmol/L Final   03/14/2024 18 (L) 20 - 29 mmol/L Final   09/15/2023 22 20 - 29 mmol/L Final     BUN   Date Value Ref Range Status   09/19/2024 16 8 - 27 mg/dL Final   03/14/2024 19 8 - 27 mg/dL Final   09/15/2023 12 8 - 27 mg/dL Final     Creatinine   Date Value Ref Range Status   09/19/2024 0.93 0.57 -  "1.00 mg/dL Final   03/14/2024 0.93 0.57 - 1.00 mg/dL Final   09/15/2023 1.00 0.57 - 1.00 mg/dL Final   07/25/2022 0.92 0.60 - 1.30 mg/dL Final     Comment:     Standardized to IDMS reference method     Glucose, Random   Date Value Ref Range Status   09/19/2024 97 70 - 99 mg/dL Final   03/14/2024 105 (H) 70 - 99 mg/dL Final   09/15/2023 90 70 - 99 mg/dL Final     Calcium   Date Value Ref Range Status   07/25/2022 9.2 8.4 - 10.2 mg/dL Final     Albumin   Date Value Ref Range Status   09/19/2024 4.4 3.9 - 4.9 g/dL Final   03/14/2024 4.2 3.9 - 4.9 g/dL Final   09/15/2023 4.1 3.9 - 4.9 g/dL Final   07/25/2022 3.9 3.5 - 5.0 g/dL Final     TOTAL BILIRUBIN   Date Value Ref Range Status   09/19/2024 0.3 0.0 - 1.2 mg/dL Final   03/14/2024 0.3 0.0 - 1.2 mg/dL Final   09/15/2023 0.3 0.0 - 1.2 mg/dL Final     Alkaline Phosphatase   Date Value Ref Range Status   07/25/2022 79 34 - 104 U/L Final     AST   Date Value Ref Range Status   09/19/2024 30 0 - 40 IU/L Final   03/14/2024 31 0 - 40 IU/L Final   09/15/2023 32 0 - 40 IU/L Final     ALT   Date Value Ref Range Status   09/19/2024 27 0 - 32 IU/L Final   03/14/2024 33 (H) 0 - 32 IU/L Final   09/15/2023 39 (H) 0 - 32 IU/L Final      LDH   Date Value Ref Range Status   09/19/2024 178 119 - 226 IU/L Final   03/14/2024 139 119 - 226 IU/L Final   09/15/2023 142 119 - 226 IU/L Final     LD   Date Value Ref Range Status   07/25/2022 126 (L) 140 - 271 U/L Final     No results found for: \"TSH\"  No results found for: \"V8UNZON\"   No results found for: \"FREET4\"      RECENT IMAGING:  Procedure: X-Ray Spine Lumbar 1 View    Result Date: 9/20/2024  Narrative: 1 view of the lumbar spine taken reviewed today includes seated L5-S1 lateral view which is appropriate for surgical planning.    Procedure: X-Ray Spine Lumbosacral with Flexion/Extension    Result Date: 9/17/2024  Narrative: Lumbar flexion and extension films obtained in the office on September 17, 2024 independently reviewed by me " demonstrates 5 nonrib-bearing vertebrae with no scoliotic curve.  This redemonstrates the grade 1 anterolisthesis of L4 on L5 without apparent translatory motion on flexion and extension.   Extension does produce slight anterolisthesis of L3.  There are no fractures apparent and there are severe degenerative findings throughout to include DDD and facet arthropathy           Assessment/Plan  Ms. Santacruz is a six 9-year-old female with melanoma in situ here for continued monitoring, follow-up and surveillance.    1. Encounter for follow-up surveillance of melanoma  2. Malignant melanoma of torso excluding breast (HCC)  She is doing well with no clinical evidence of disease recurrence.  Labs reviewed and okay.  She knows to continue to monitor for new, changing, concerning skin lesions or lymphadenopathy.  She continues to follow with dermatology.  She knows to call with issues or concerns prior to her next visit.  She will return in 6 months for follow-up with blood work to be done at that time.  All orders placed and prescription provided.      - CBC and differential; Future  - Comprehensive metabolic panel; Future  - LD,Blood; Future    3. Charcot foot due to diabetes mellitus (HCC)  Stable.  No changes at this time.  Continue to monitor.  If we have are needed to treat her we would monitor her closely.  Continue to follow PCP.        Return in about 6 months (around 3/30/2025) for Office Visit, labs.     Mansi Guillen MD, PhD

## 2025-03-26 LAB
ALBUMIN SERPL-MCNC: 4.3 G/DL (ref 3.9–4.9)
ALP SERPL-CCNC: 92 IU/L (ref 44–121)
ALT SERPL-CCNC: 21 IU/L (ref 0–32)
AST SERPL-CCNC: 24 IU/L (ref 0–40)
BASOPHILS # BLD AUTO: 0 X10E3/UL (ref 0–0.2)
BASOPHILS NFR BLD AUTO: 1 %
BILIRUB SERPL-MCNC: 0.4 MG/DL (ref 0–1.2)
BUN SERPL-MCNC: 21 MG/DL (ref 8–27)
BUN/CREAT SERPL: 22 (ref 12–28)
CALCIUM SERPL-MCNC: 9.9 MG/DL (ref 8.7–10.3)
CHLORIDE SERPL-SCNC: 106 MMOL/L (ref 96–106)
CO2 SERPL-SCNC: 22 MMOL/L (ref 20–29)
CREAT SERPL-MCNC: 0.94 MG/DL (ref 0.57–1)
EGFR: 66 ML/MIN/1.73
EOSINOPHIL # BLD AUTO: 0.3 X10E3/UL (ref 0–0.4)
EOSINOPHIL NFR BLD AUTO: 5 %
ERYTHROCYTE [DISTWIDTH] IN BLOOD BY AUTOMATED COUNT: 13 % (ref 11.7–15.4)
GLOBULIN SER-MCNC: 1.9 G/DL (ref 1.5–4.5)
GLUCOSE SERPL-MCNC: 86 MG/DL (ref 70–99)
HCT VFR BLD AUTO: 44 % (ref 34–46.6)
HGB BLD-MCNC: 14.4 G/DL (ref 11.1–15.9)
IMM GRANULOCYTES # BLD: 0 X10E3/UL (ref 0–0.1)
IMM GRANULOCYTES NFR BLD: 0 %
LDH SERPL-CCNC: 195 IU/L (ref 119–226)
LYMPHOCYTES # BLD AUTO: 1.8 X10E3/UL (ref 0.7–3.1)
LYMPHOCYTES NFR BLD AUTO: 27 %
MCH RBC QN AUTO: 30.6 PG (ref 26.6–33)
MCHC RBC AUTO-ENTMCNC: 32.7 G/DL (ref 31.5–35.7)
MCV RBC AUTO: 94 FL (ref 79–97)
MONOCYTES # BLD AUTO: 0.4 X10E3/UL (ref 0.1–0.9)
MONOCYTES NFR BLD AUTO: 6 %
NEUTROPHILS # BLD AUTO: 4.2 X10E3/UL (ref 1.4–7)
NEUTROPHILS NFR BLD AUTO: 61 %
PLATELET # BLD AUTO: 324 X10E3/UL (ref 150–450)
POTASSIUM SERPL-SCNC: 4.2 MMOL/L (ref 3.5–5.2)
PROT SERPL-MCNC: 6.2 G/DL (ref 6–8.5)
RBC # BLD AUTO: 4.7 X10E6/UL (ref 3.77–5.28)
SODIUM SERPL-SCNC: 142 MMOL/L (ref 134–144)
WBC # BLD AUTO: 6.7 X10E3/UL (ref 3.4–10.8)

## 2025-03-31 ENCOUNTER — OFFICE VISIT (OUTPATIENT)
Dept: HEMATOLOGY ONCOLOGY | Facility: CLINIC | Age: 70
End: 2025-03-31
Payer: MEDICARE

## 2025-03-31 VITALS
WEIGHT: 140 LBS | DIASTOLIC BLOOD PRESSURE: 78 MMHG | HEART RATE: 88 BPM | SYSTOLIC BLOOD PRESSURE: 124 MMHG | RESPIRATION RATE: 16 BRPM | TEMPERATURE: 98.1 F | OXYGEN SATURATION: 100 % | HEIGHT: 65 IN | BODY MASS INDEX: 23.32 KG/M2

## 2025-03-31 DIAGNOSIS — Z08 ENCOUNTER FOR FOLLOW-UP SURVEILLANCE OF MELANOMA: Primary | ICD-10-CM

## 2025-03-31 DIAGNOSIS — C43.59 MALIGNANT MELANOMA OF TORSO EXCLUDING BREAST (HCC): ICD-10-CM

## 2025-03-31 DIAGNOSIS — Z85.820 ENCOUNTER FOR FOLLOW-UP SURVEILLANCE OF MELANOMA: Primary | ICD-10-CM

## 2025-03-31 PROCEDURE — G2211 COMPLEX E/M VISIT ADD ON: HCPCS | Performed by: INTERNAL MEDICINE

## 2025-03-31 PROCEDURE — 99213 OFFICE O/P EST LOW 20 MIN: CPT | Performed by: INTERNAL MEDICINE

## 2025-03-31 NOTE — ASSESSMENT & PLAN NOTE
Ms. Santacruz is a 70 yr female with melanoma of the torso here for follow up, monitoring, and surveillance.  She is doing well with no clinical evidence of recurrence.  Labs reviewed and ok.  She will continue with follow up with dermatology and monitor for new, changing, and concerning lesions or LAD.  She knows to call with any issues or concerns.  Orders placed for blood work for prior to her next visit.  Orders:    CBC and differential; Future    Comprehensive metabolic panel; Future    LD,Blood; Future

## 2025-03-31 NOTE — LETTER
2025     Betzaida Almeida MD  [address]  [address] / [address]  [facility]    Patient: Milvia Santacruz   YOB: 1955   Date of Visit: 3/31/2025       Dear Dr. Almeida:    Thank you for referring Milvia Santacruz to me for evaluation. Below are my notes for this consultation.    If you have questions, please do not hesitate to call me. I look forward to following your patient along with you.         Sincerely,        Mansi Guillen MD        CC: No Recipients    Mansi Guillen MD  2025  7:25 AM  Sign when Signing Visit  Name: Milvia Santacruz      : 1955      MRN: [id number]  Encounter Provider: Mansi Guillen MD  Encounter Date: 3/31/2025   Encounter department: [facility]  :  Assessment & Plan  Encounter for follow-up surveillance of melanoma  Ms. Santacruz is a 70 yr female with melanoma of the torso here for follow up, monitoring, and surveillance.  She is doing well with no clinical evidence of recurrence.  Labs reviewed and ok.  She will continue with follow up with dermatology and monitor for new, changing, and concerning lesions or LAD.  She knows to call with any issues or concerns.  Orders placed for blood work for prior to her next visit.       Malignant melanoma of torso excluding breast (HCC)  Ms. Santacruz is a 70 yr female with melanoma of the torso here for follow up, monitoring, and surveillance.  She is doing well with no clinical evidence of recurrence.  Labs reviewed and ok.  She will continue with follow up with dermatology and monitor for new, changing, and concerning lesions or LAD.  She knows to call with any issues or concerns.  Orders placed for blood work for prior to her next visit.  Orders:  •  CBC and differential; Future  •  Comprehensive metabolic panel; Future  •  LD,Blood; Future        Follow up in 6 months    History of Present Illness  Chief Complaint   Patient presents with   • Follow-up     Oncology  History  Cancer Staging   Malignant melanoma of torso excluding breast (HCC)  Staging form: Melanoma of the Skin, AJCC 8th Edition  - Clinical stage from 6/26/2018: Stage 0 (cTis, cN0, cM0) - Signed by Mansi Guillen MD on 1/31/2022  Oncology History   Malignant melanoma of torso excluding breast (HCC)   6/26/2018 -  Cancer Staged    Staging form: Melanoma of the Skin, AJCC 8th Edition  - Clinical stage from 6/26/2018: Stage 0 (cTis, cN0, cM0) - Signed by Mansi Guillen MD on 1/31/2022 6/26/2018 Initial Diagnosis    Malignant melanoma of torso excluding breast (HCC)  MIS + margins     8/3/2018 Surgery    Excision - residual melanoma in situ and scar, negative margins        Pertinent Medical History    04/02/25:   Ms. Santacruz is a 70 yr female melanoma of the torso for continued monitoring, follow-up and surveillance.  She is doing well.  Continues to follow closely with dermatology.  Has a few spots that she notes on bilateral forearms otherwise no other additional concerning, changing or new lesions.  No lymphadenopathy.    Feels good.  Energy is good.  Eating well.       Review of Systems   Constitutional:  Negative for activity change, chills, diaphoresis, fatigue, fever and unexpected weight change.   HENT:  Negative for congestion, hearing loss, trouble swallowing and voice change.    Respiratory:  Negative for cough, shortness of breath and wheezing.    Cardiovascular:  Negative for chest pain, palpitations and leg swelling.   Gastrointestinal:  Negative for abdominal distention, abdominal pain, constipation, diarrhea, nausea and vomiting.   Musculoskeletal:  Negative for arthralgias and myalgias.   Skin:  Negative for color change and rash.   Neurological:  Negative for dizziness, seizures, speech difficulty, weakness, light-headedness and headaches.   Hematological:  Negative for adenopathy.     Current Outpatient Medications on File Prior to Visit   Medication Sig Dispense Refill   •  Acetaminophen (Tylenol Dissolve Packs) 500 MG PACK Take by mouth daily at bedtime     • Alpha-Lipoic Acid 200 MG CAPS 200 mg     • ascorbic acid (VITAMIN C) 500 MG tablet Take 500 mg by mouth daily     • atorvastatin (LIPITOR) 10 mg tablet Take 1 tablet by mouth daily     • Calcium Carbonate 1500 (600 Ca) MG TABS Take 600 mg by mouth     • celecoxib (CeleBREX) 50 MG capsule Take by mouth in the morning     • cholecalciferol (VITAMIN D3) 1,000 units tablet      • cyanocobalamin (VITAMIN B-12) 1000 MCG tablet Take by mouth     • diazePAM 5 MG/5ML SOLN  (Patient taking differently: As needed.)     • DULoxetine (CYMBALTA) 30 mg delayed release capsule Take 1 capsule by mouth daily     • Echinacea Plus CAPS Take by mouth in the morning     • gabapentin (NEURONTIN) 300 mg capsule Take 300 mg by mouth     • lidocaine (LIDODERM) 5 %      • meclizine (ANTIVERT) 12.5 MG tablet as needed     • metFORMIN (GLUCOPHAGE-XR) 750 mg 24 hr tablet Take 750 mg by mouth daily with dinner     • Multiple Vitamins-Minerals (Ocuvite Adult 50+) CAPS      • Multiple Vitamins-Minerals (PreserVision AREDS 2+Multi Vit) CAPS      • Multiple Vitamins-Minerals (WOMENS 50+ MULTI VITAMIN/MIN PO)      • Naproxen Sodium 220 MG CAPS Take by mouth 2 (two) times a day     • omeprazole (PriLOSEC) 40 MG capsule Take 1 capsule by mouth daily (Patient taking differently: Take 1 capsule by mouth daily As needed.)     • phentermine (ADIPEX-P) 37.5 MG tablet Take 1 tablet by mouth 2 (two) times a day     • topiramate (TOPAMAX) 100 mg tablet Take 100 mg by mouth daily     • Wegovy 1.7 MG/0.75ML once a week     • Calcium Carbonate-Vitamin D 600-400 MG-UNIT per tablet  (Patient not taking: Reported on 9/30/2024)     • Multiple Vitamin (MULTIVITAMIN ADULT PO) Take 1 capsule by mouth daily (Patient not taking: Reported on 3/20/2024)       No current facility-administered medications on file prior to visit.          Objective  /78 (BP Location: Right arm, Patient  "Position: Sitting, Cuff Size: Adult)   Pulse 88   Temp 98.1 °F (36.7 °C) (Temporal)   Resp 16   Ht 5' 5\" (1.651 m)   Wt 63.5 kg (140 lb)   SpO2 100%   BMI 23.30 kg/m²     Pain Screening:  Pain Score: 0-No pain  ECOG ECOG Performance Status: 0 - Fully active, able to carry on all pre-disease performance without restriction     Physical Exam  Constitutional:       General: She is not in acute distress.     Appearance: Normal appearance. She is not ill-appearing.   HENT:      Head: Normocephalic and atraumatic.      Right Ear: External ear normal.      Left Ear: External ear normal.      Nose: Nose normal. No congestion.      Mouth/Throat:      Mouth: Mucous membranes are moist.      Pharynx: Oropharynx is clear. No oropharyngeal exudate.   Eyes:      General: No scleral icterus.        Right eye: No discharge.         Left eye: No discharge.      Conjunctiva/sclera: Conjunctivae normal.   Cardiovascular:      Rate and Rhythm: Normal rate and regular rhythm.      Pulses: Normal pulses.      Heart sounds: No murmur heard.     No friction rub. No gallop.   Pulmonary:      Effort: Pulmonary effort is normal. No respiratory distress.      Breath sounds: Normal breath sounds. No wheezing or rales.   Abdominal:      General: Bowel sounds are normal. There is no distension.      Palpations: There is no mass.      Tenderness: There is no abdominal tenderness. There is no rebound.   Musculoskeletal:         General: No swelling or tenderness. Normal range of motion.      Cervical back: Normal range of motion. No rigidity.      Right lower leg: No edema.      Left lower leg: No edema.   Lymphadenopathy:      Head:      Right side of head: No submental, submandibular, preauricular, posterior auricular or occipital adenopathy.      Left side of head: No submental, submandibular, preauricular, posterior auricular or occipital adenopathy.      Cervical: No cervical adenopathy.      Right cervical: No superficial or posterior " cervical adenopathy.     Left cervical: No superficial or posterior cervical adenopathy.      Upper Body:      Right upper body: No supraclavicular or axillary adenopathy.      Left upper body: No supraclavicular or axillary adenopathy.      Lower Body: No right inguinal adenopathy. No left inguinal adenopathy.   Skin:     General: Skin is warm.      Coloration: Skin is not jaundiced.      Findings: No lesion or rash.      Comments: Scar well healed.  No evidence of recurrence at primary site.  No concerning skin lesions   Neurological:      General: No focal deficit present.      Mental Status: She is alert and oriented to person, place, and time.      Cranial Nerves: No cranial nerve deficit.      Motor: No weakness.      Gait: Gait normal.   Psychiatric:         Mood and Affect: Mood normal.         Behavior: Behavior normal.         Thought Content: Thought content normal.         Judgment: Judgment normal.         Labs: I have reviewed the following labs:  Lab Results   Component Value Date/Time    White Blood Cell Count 6.7 03/25/2025 10:40 AM    Red Blood Cell Count 4.70 03/25/2025 10:40 AM    Hemoglobin 14.4 03/25/2025 10:40 AM    HCT 44.0 03/25/2025 10:40 AM    MCV 94 03/25/2025 10:40 AM    MCH 30.6 03/25/2025 10:40 AM    RDW 13.0 03/25/2025 10:40 AM    Platelet Count 324 03/25/2025 10:40 AM    Neutrophils 61 03/25/2025 10:40 AM    Lymphocytes 27 03/25/2025 10:40 AM    Monocytes 6 03/25/2025 10:40 AM    Eosinophils 5 03/25/2025 10:40 AM    Neutrophils (Absolute) 4.2 03/25/2025 10:40 AM     Lab Results   Component Value Date/Time    Potassium 4.2 03/25/2025 10:40 AM    Chloride 106 03/25/2025 10:40 AM    CO2 22 03/25/2025 10:40 AM    BUN 21 03/25/2025 10:40 AM    Creatinine 0.94 03/25/2025 10:40 AM    AST 24 03/25/2025 10:40 AM    ALT 21 03/25/2025 10:40 AM    Protein, Total 6.2 03/25/2025 10:40 AM    Albumin 4.3 03/25/2025 10:40 AM    TOTAL BILIRUBIN 0.4 03/25/2025 10:40 AM    eGFR 66 03/25/2025 10:40 AM      Lab Results   Component Value Date/Time     03/25/2025 10:40 AM        Radiology Results Review : No pertinent imaging studies reviewed.        Mansi Guillen MD, PhD

## 2025-03-31 NOTE — PROGRESS NOTES
Name: Milvia Santacruz      : 1955      MRN: [id number]  Encounter Provider: Mansi Guillen MD  Encounter Date: 3/31/2025   Encounter department: [facility] [place]  :  Assessment & Plan  Encounter for follow-up surveillance of melanoma  Ms. Santacruz is a 70 yr female with melanoma of the torso here for follow up, monitoring, and surveillance.  She is doing well with no clinical evidence of recurrence.  Labs reviewed and ok.  She will continue with follow up with dermatology and monitor for new, changing, and concerning lesions or LAD.  She knows to call with any issues or concerns.  Orders placed for blood work for prior to her next visit.       Malignant melanoma of torso excluding breast (HCC)  Ms. Santacruz is a 70 yr female with melanoma of the torso here for follow up, monitoring, and surveillance.  She is doing well with no clinical evidence of recurrence.  Labs reviewed and ok.  She will continue with follow up with dermatology and monitor for new, changing, and concerning lesions or LAD.  She knows to call with any issues or concerns.  Orders placed for blood work for prior to her next visit.  Orders:    CBC and differential; Future    Comprehensive metabolic panel; Future    LD,Blood; Future        Follow up in 6 months    History of Present Illness   Chief Complaint   Patient presents with    Follow-up     Oncology History   Cancer Staging   Malignant melanoma of torso excluding breast (HCC)  Staging form: Melanoma of the Skin, AJCC 8th Edition  - Clinical stage from 2018: Stage 0 (cTis, cN0, cM0) - Signed by Mansi Guillen MD on 2022  Oncology History   Malignant melanoma of torso excluding breast (HCC)   2018 -  Cancer Staged    Staging form: Melanoma of the Skin, AJCC 8th Edition  - Clinical stage from 2018: Stage 0 (cTis, cN0, cM0) - Signed by Mansi Guillen MD on 2022 Initial Diagnosis    Malignant melanoma of torso  excluding breast (HCC)  MIS + margins     8/3/2018 Surgery    Excision - residual melanoma in situ and scar, negative margins        Pertinent Medical History     04/02/25:   Ms. Santacruz is a 70 yr female melanoma of the torso for continued monitoring, follow-up and surveillance.  She is doing well.  Continues to follow closely with dermatology.  Has a few spots that she notes on bilateral forearms otherwise no other additional concerning, changing or new lesions.  No lymphadenopathy.    Feels good.  Energy is good.  Eating well.       Review of Systems   Constitutional:  Negative for activity change, chills, diaphoresis, fatigue, fever and unexpected weight change.   HENT:  Negative for congestion, hearing loss, trouble swallowing and voice change.    Respiratory:  Negative for cough, shortness of breath and wheezing.    Cardiovascular:  Negative for chest pain, palpitations and leg swelling.   Gastrointestinal:  Negative for abdominal distention, abdominal pain, constipation, diarrhea, nausea and vomiting.   Musculoskeletal:  Negative for arthralgias and myalgias.   Skin:  Negative for color change and rash.   Neurological:  Negative for dizziness, seizures, speech difficulty, weakness, light-headedness and headaches.   Hematological:  Negative for adenopathy.     Current Outpatient Medications on File Prior to Visit   Medication Sig Dispense Refill    Acetaminophen (Tylenol Dissolve Packs) 500 MG PACK Take by mouth daily at bedtime      Alpha-Lipoic Acid 200 MG CAPS 200 mg      ascorbic acid (VITAMIN C) 500 MG tablet Take 500 mg by mouth daily      atorvastatin (LIPITOR) 10 mg tablet Take 1 tablet by mouth daily      Calcium Carbonate 1500 (600 Ca) MG TABS Take 600 mg by mouth      celecoxib (CeleBREX) 50 MG capsule Take by mouth in the morning      cholecalciferol (VITAMIN D3) 1,000 units tablet       cyanocobalamin (VITAMIN B-12) 1000 MCG tablet Take by mouth      diazePAM 5 MG/5ML SOLN  (Patient taking  "differently: As needed.)      DULoxetine (CYMBALTA) 30 mg delayed release capsule Take 1 capsule by mouth daily      Echinacea Plus CAPS Take by mouth in the morning      gabapentin (NEURONTIN) 300 mg capsule Take 300 mg by mouth      lidocaine (LIDODERM) 5 %       meclizine (ANTIVERT) 12.5 MG tablet as needed      metFORMIN (GLUCOPHAGE-XR) 750 mg 24 hr tablet Take 750 mg by mouth daily with dinner      Multiple Vitamins-Minerals (Ocuvite Adult 50+) CAPS       Multiple Vitamins-Minerals (PreserVision AREDS 2+Multi Vit) CAPS       Multiple Vitamins-Minerals (WOMENS 50+ MULTI VITAMIN/MIN PO)       Naproxen Sodium 220 MG CAPS Take by mouth 2 (two) times a day      omeprazole (PriLOSEC) 40 MG capsule Take 1 capsule by mouth daily (Patient taking differently: Take 1 capsule by mouth daily As needed.)      phentermine (ADIPEX-P) 37.5 MG tablet Take 1 tablet by mouth 2 (two) times a day      topiramate (TOPAMAX) 100 mg tablet Take 100 mg by mouth daily      Wegovy 1.7 MG/0.75ML once a week      Calcium Carbonate-Vitamin D 600-400 MG-UNIT per tablet  (Patient not taking: Reported on 9/30/2024)      Multiple Vitamin (MULTIVITAMIN ADULT PO) Take 1 capsule by mouth daily (Patient not taking: Reported on 3/20/2024)       No current facility-administered medications on file prior to visit.          Objective   /78 (BP Location: Right arm, Patient Position: Sitting, Cuff Size: Adult)   Pulse 88   Temp 98.1 °F (36.7 °C) (Temporal)   Resp 16   Ht 5' 5\" (1.651 m)   Wt 63.5 kg (140 lb)   SpO2 100%   BMI 23.30 kg/m²     Pain Screening:  Pain Score: 0-No pain  ECOG ECOG Performance Status: 0 - Fully active, able to carry on all pre-disease performance without restriction     Physical Exam  Constitutional:       General: She is not in acute distress.     Appearance: Normal appearance. She is not ill-appearing.   HENT:      Head: Normocephalic and atraumatic.      Right Ear: External ear normal.      Left Ear: External ear " normal.      Nose: Nose normal. No congestion.      Mouth/Throat:      Mouth: Mucous membranes are moist.      Pharynx: Oropharynx is clear. No oropharyngeal exudate.   Eyes:      General: No scleral icterus.        Right eye: No discharge.         Left eye: No discharge.      Conjunctiva/sclera: Conjunctivae normal.   Cardiovascular:      Rate and Rhythm: Normal rate and regular rhythm.      Pulses: Normal pulses.      Heart sounds: No murmur heard.     No friction rub. No gallop.   Pulmonary:      Effort: Pulmonary effort is normal. No respiratory distress.      Breath sounds: Normal breath sounds. No wheezing or rales.   Abdominal:      General: Bowel sounds are normal. There is no distension.      Palpations: There is no mass.      Tenderness: There is no abdominal tenderness. There is no rebound.   Musculoskeletal:         General: No swelling or tenderness. Normal range of motion.      Cervical back: Normal range of motion. No rigidity.      Right lower leg: No edema.      Left lower leg: No edema.   Lymphadenopathy:      Head:      Right side of head: No submental, submandibular, preauricular, posterior auricular or occipital adenopathy.      Left side of head: No submental, submandibular, preauricular, posterior auricular or occipital adenopathy.      Cervical: No cervical adenopathy.      Right cervical: No superficial or posterior cervical adenopathy.     Left cervical: No superficial or posterior cervical adenopathy.      Upper Body:      Right upper body: No supraclavicular or axillary adenopathy.      Left upper body: No supraclavicular or axillary adenopathy.      Lower Body: No right inguinal adenopathy. No left inguinal adenopathy.   Skin:     General: Skin is warm.      Coloration: Skin is not jaundiced.      Findings: No lesion or rash.      Comments: Scar well healed.  No evidence of recurrence at primary site.  No concerning skin lesions   Neurological:      General: No focal deficit present.       Mental Status: She is alert and oriented to person, place, and time.      Cranial Nerves: No cranial nerve deficit.      Motor: No weakness.      Gait: Gait normal.   Psychiatric:         Mood and Affect: Mood normal.         Behavior: Behavior normal.         Thought Content: Thought content normal.         Judgment: Judgment normal.         Labs: I have reviewed the following labs:  Lab Results   Component Value Date/Time    White Blood Cell Count 6.7 03/25/2025 10:40 AM    Red Blood Cell Count 4.70 03/25/2025 10:40 AM    Hemoglobin 14.4 03/25/2025 10:40 AM    HCT 44.0 03/25/2025 10:40 AM    MCV 94 03/25/2025 10:40 AM    MCH 30.6 03/25/2025 10:40 AM    RDW 13.0 03/25/2025 10:40 AM    Platelet Count 324 03/25/2025 10:40 AM    Neutrophils 61 03/25/2025 10:40 AM    Lymphocytes 27 03/25/2025 10:40 AM    Monocytes 6 03/25/2025 10:40 AM    Eosinophils 5 03/25/2025 10:40 AM    Neutrophils (Absolute) 4.2 03/25/2025 10:40 AM     Lab Results   Component Value Date/Time    Potassium 4.2 03/25/2025 10:40 AM    Chloride 106 03/25/2025 10:40 AM    CO2 22 03/25/2025 10:40 AM    BUN 21 03/25/2025 10:40 AM    Creatinine 0.94 03/25/2025 10:40 AM    AST 24 03/25/2025 10:40 AM    ALT 21 03/25/2025 10:40 AM    Protein, Total 6.2 03/25/2025 10:40 AM    Albumin 4.3 03/25/2025 10:40 AM    TOTAL BILIRUBIN 0.4 03/25/2025 10:40 AM    eGFR 66 03/25/2025 10:40 AM     Lab Results   Component Value Date/Time     03/25/2025 10:40 AM        Radiology Results Review : No pertinent imaging studies reviewed.        Mansi Guillen MD, PhD

## 2025-08-01 ENCOUNTER — TELEPHONE (OUTPATIENT)
Age: 70
End: 2025-08-01